# Patient Record
Sex: MALE | Race: WHITE | Employment: FULL TIME | URBAN - NONMETROPOLITAN AREA
[De-identification: names, ages, dates, MRNs, and addresses within clinical notes are randomized per-mention and may not be internally consistent; named-entity substitution may affect disease eponyms.]

---

## 2017-08-07 DIAGNOSIS — R30.0 DYSURIA: Primary | ICD-10-CM

## 2017-08-11 ENCOUNTER — OFFICE VISIT (OUTPATIENT)
Dept: FAMILY MEDICINE CLINIC | Age: 32
End: 2017-08-11
Payer: COMMERCIAL

## 2017-08-11 ENCOUNTER — HOSPITAL ENCOUNTER (OUTPATIENT)
Age: 32
Setting detail: SPECIMEN
Discharge: HOME | End: 2017-08-11
Payer: COMMERCIAL

## 2017-08-11 VITALS
OXYGEN SATURATION: 97 % | SYSTOLIC BLOOD PRESSURE: 120 MMHG | WEIGHT: 130 LBS | DIASTOLIC BLOOD PRESSURE: 72 MMHG | HEIGHT: 66 IN | BODY MASS INDEX: 20.89 KG/M2 | HEART RATE: 81 BPM

## 2017-08-11 DIAGNOSIS — N39.0 URINARY TRACT INFECTION WITHOUT HEMATURIA, SITE UNSPECIFIED: ICD-10-CM

## 2017-08-11 DIAGNOSIS — N39.0 URINARY TRACT INFECTION WITHOUT HEMATURIA, SITE UNSPECIFIED: Primary | ICD-10-CM

## 2017-08-11 LAB
-: ABNORMAL
AMORPHOUS: ABNORMAL
BACTERIA: ABNORMAL
CASTS UA: ABNORMAL /LPF (ref 0–2)
CRYSTALS, UA: ABNORMAL /HPF
EPITHELIAL CELLS UA: ABNORMAL /HPF (ref 0–5)
MUCUS: ABNORMAL
OTHER OBSERVATIONS UA: ABNORMAL
RBC UA: ABNORMAL /HPF (ref 0–4)
RENAL EPITHELIAL, UA: ABNORMAL /HPF
TRICHOMONAS: ABNORMAL
WBC UA: >100 /HPF (ref 0–4)
YEAST: ABNORMAL

## 2017-08-11 PROCEDURE — 99213 OFFICE O/P EST LOW 20 MIN: CPT | Performed by: NURSE PRACTITIONER

## 2017-08-11 RX ORDER — PHENAZOPYRIDINE HYDROCHLORIDE 200 MG/1
200 TABLET, FILM COATED ORAL 3 TIMES DAILY PRN
Qty: 21 TABLET | Refills: 0 | Status: SHIPPED | OUTPATIENT
Start: 2017-08-11 | End: 2021-10-05 | Stop reason: SDUPTHER

## 2017-08-11 RX ORDER — CIPROFLOXACIN 500 MG/1
500 TABLET, FILM COATED ORAL 2 TIMES DAILY
Qty: 28 TABLET | Refills: 0 | Status: SHIPPED | OUTPATIENT
Start: 2017-08-11 | End: 2017-08-25

## 2017-08-11 ASSESSMENT — ENCOUNTER SYMPTOMS
DIARRHEA: 0
SHORTNESS OF BREATH: 0
EYES NEGATIVE: 1
CHEST TIGHTNESS: 0
TROUBLE SWALLOWING: 0
ABDOMINAL PAIN: 0
GASTROINTESTINAL NEGATIVE: 1
ALLERGIC/IMMUNOLOGIC NEGATIVE: 1
VOMITING: 0
CONSTIPATION: 0
SINUS PRESSURE: 0
NAUSEA: 0
RESPIRATORY NEGATIVE: 1
COUGH: 0

## 2017-08-12 LAB
CULTURE: NORMAL
Lab: NORMAL
Lab: NORMAL
SPECIMEN DESCRIPTION: NORMAL
STATUS: NORMAL
STATUS: NORMAL

## 2017-08-14 LAB
C. TRACHOMATIS DNA ,URINE: NEGATIVE
N. GONORRHOEAE DNA, URINE: NEGATIVE

## 2019-06-28 ENCOUNTER — OFFICE VISIT (OUTPATIENT)
Dept: PRIMARY CARE CLINIC | Age: 34
End: 2019-06-28
Payer: COMMERCIAL

## 2019-06-28 VITALS
SYSTOLIC BLOOD PRESSURE: 112 MMHG | WEIGHT: 134.4 LBS | DIASTOLIC BLOOD PRESSURE: 82 MMHG | BODY MASS INDEX: 21.6 KG/M2 | TEMPERATURE: 98 F | OXYGEN SATURATION: 92 % | HEIGHT: 66 IN | HEART RATE: 83 BPM

## 2019-06-28 DIAGNOSIS — M25.562 ACUTE PAIN OF LEFT KNEE: Primary | ICD-10-CM

## 2019-06-28 DIAGNOSIS — D22.9 BENIGN SKIN MOLE: ICD-10-CM

## 2019-06-28 PROCEDURE — 4004F PT TOBACCO SCREEN RCVD TLK: CPT | Performed by: NURSE PRACTITIONER

## 2019-06-28 PROCEDURE — 99213 OFFICE O/P EST LOW 20 MIN: CPT | Performed by: NURSE PRACTITIONER

## 2019-06-28 PROCEDURE — G8420 CALC BMI NORM PARAMETERS: HCPCS | Performed by: NURSE PRACTITIONER

## 2019-06-28 PROCEDURE — G8427 DOCREV CUR MEDS BY ELIG CLIN: HCPCS | Performed by: NURSE PRACTITIONER

## 2019-06-28 ASSESSMENT — ENCOUNTER SYMPTOMS
NAUSEA: 0
SORE THROAT: 0
RESPIRATORY NEGATIVE: 1
COUGH: 0

## 2019-06-28 ASSESSMENT — PATIENT HEALTH QUESTIONNAIRE - PHQ9
SUM OF ALL RESPONSES TO PHQ9 QUESTIONS 1 & 2: 0
SUM OF ALL RESPONSES TO PHQ QUESTIONS 1-9: 0
1. LITTLE INTEREST OR PLEASURE IN DOING THINGS: 0
2. FEELING DOWN, DEPRESSED OR HOPELESS: 0
SUM OF ALL RESPONSES TO PHQ QUESTIONS 1-9: 0

## 2019-06-28 NOTE — PROGRESS NOTES
[facility]             [address] [facility], [address]                        Telephone [phone]             Fax [phone]     Fernanda Mai  1985  JJY:[id number]   Date of visit:  6/28/2019    Subjective:    Fernanda Mai is a 36 y.o.  male who presents to [facility] today (6/28/2019) for evaluation of:    Chief Complaint   Patient presents with    Knee Pain     Left knee. No injury.  Other     Multiple bumps on arms under old tattoo ink       Knee Pain    The incident occurred more than 1 week ago (1 month ago). 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 The pain is present in the left knee. The quality of the pain is described as aching (sharp in the morning). The patient is experiencing no pain. The pain has been intermittent since onset. Pertinent negatives include no inability to bear weight, muscle weakness, numbness or tingling. Nothing aggravates the symptoms. He has tried nothing for the symptoms. The treatment provided no relief. Other   This is a new problem. The current episode started more than 1 month ago. The problem occurs constantly. The problem has been unchanged. Pertinent negatives include no chest pain, chills, congestion, coughing, headaches, joint swelling, nausea, numbness, rash or sore throat. Associated symptoms comments: Bumps on left forearm where old tattoo ink (several years old) is. . Nothing aggravates the symptoms. He has tried nothing for the symptoms. The treatment provided no relief. He has the following problem list:  Patient Active Problem List   Diagnosis    Acute cystitis without hematuria        Current medications are:  No current outpatient medications on file. No current facility-administered medications for this visit.          He is allergic to sulfa antibiotics and As pain recedes, begin normal activities slowly as tolerated. Do not pick at moles on arm, we discussed changes to monitor for. Follow up with PCP if symptoms do not improve or worsen. No orders of the defined types were placed in this encounter.         Electronically signed by JAYESH Bautista CNP on 6/28/19 at 3:17 PM

## 2019-06-28 NOTE — PATIENT INSTRUCTIONS
Patient Education        Moles: Care Instructions  Your Care Instructions  Moles are skin growths made up of cells that produce color (pigment). A mole can appear anywhere on the skin, alone or in groups. Most people get a few moles during their first 20 years of life. They are usually brown in color but can be blue, black, or flesh-colored. Most moles are harmless and do not cause pain or other symptoms, unless you rub them or they bump against something. You usually do not need treatment for moles. But some can turn into cancer. Talk to your doctor if a mole bleeds, itches, burns, or changes size or color. Also let your doctor know if you get a new mole. Make sure to wear sunscreen and other sun protection every day to help prevent skin cancer. Follow-up care is a key part of your treatment and safety. Be sure to make and go to all appointments, and call your doctor if you are having problems. It's also a good idea to know your test results and keep a list of the medicines you take. How can you care for yourself at home? · Check all the skin on your body once a month for skin growths or other changes, such as in the color and feel of the skin. ?  front of a full-length mirror. Look carefully at the front and back of your body. Then look at your right and left sides with your arms raised. ? Bend your elbows and look carefully at your forearms, the back of your upper arms, and your palms. ? Look at your feet, the bottoms of your feet, and the spaces between your toes. ? Use a hand mirror to look at the back of your legs, the back of your neck, and your back, rear end (buttocks), and genital area. Part the hair on your head to look at your scalp. · If you see a change in a skin growth, contact your doctor. Look for:  ? A mole that bleeds. ? A fast-growing mole. ? A scaly or crusted growth on the skin. ? A sore that will not heal.  To prevent skin cancer  · Always wear sunscreen on exposed skin. Make sure to use a broad-spectrum sunscreen that has a sun protection factor (SPF) of 30 or higher. Use it every day, even when it is cloudy. · Wear a wide-brimmed hat and long sleeves and pants if you are going to be outdoors for very long. · Avoid the sun between 10 a.m. and 4 p.m., which is the peak time for the sun's ultraviolet rays. · Avoid sunburns, tanning booths, and sunlamps. · Be sure to protect children from the sun. Sunburns in childhood damage the skin and increase the risk of cancer. When should you call for help? Watch closely for changes in your health, and be sure to contact your doctor if:    · A mole looks different than it did before. It may have changed in size, color, shape, or the way it looks. Where can you learn more? Go to https://[employer]. org and sign in to your [employer] account. Enter F402 in the [employer] box to learn more about \"Moles: Care Instructions. \"     If you do not have an account, please click on the \"Sign Up Now\" link. Current as of: April 17, 2018  Content Version: 12.0  © 3846-0777 [employer]. Care instructions adapted under license by [facility] ([facility]). If you have questions about a medical condition or this instruction, always ask your healthcare professional. [place] [address] any warranty or liability for your use of this information. Patient Education        Knee Pain or Injury: Care Instructions  Your Care Instructions    Injuries are a common cause of knee problems. Sudden (acute) injuries may be caused by a direct blow to the knee. They can also be caused by abnormal twisting, bending, or falling on the knee. Pain, bruising, or swelling may be severe, and may start within minutes of the injury. Overuse is another cause of knee pain. Other causes are climbing stairs, kneeling, and other activities that use the knee.  Everyday wear and tear, especially as you get older, also can cause knee

## 2020-05-08 ENCOUNTER — HOSPITAL ENCOUNTER (OUTPATIENT)
Dept: LAB | Age: 35
Discharge: HOME | End: 2020-05-08
Payer: COMMERCIAL

## 2020-06-16 LAB
SEND OUT REPORT: NORMAL
TEST NAME: NORMAL

## 2021-02-19 ENCOUNTER — OFFICE VISIT (OUTPATIENT)
Dept: PRIMARY CARE CLINIC | Age: 36
End: 2021-02-19
Payer: COMMERCIAL

## 2021-02-19 VITALS
DIASTOLIC BLOOD PRESSURE: 68 MMHG | TEMPERATURE: 98.2 F | BODY MASS INDEX: 21.53 KG/M2 | WEIGHT: 133.4 LBS | OXYGEN SATURATION: 98 % | SYSTOLIC BLOOD PRESSURE: 112 MMHG | HEART RATE: 78 BPM

## 2021-02-19 DIAGNOSIS — S46.911A STRAIN OF RIGHT SHOULDER, INITIAL ENCOUNTER: Primary | ICD-10-CM

## 2021-02-19 DIAGNOSIS — H66.001 NON-RECURRENT ACUTE SUPPURATIVE OTITIS MEDIA OF RIGHT EAR WITHOUT SPONTANEOUS RUPTURE OF TYMPANIC MEMBRANE: ICD-10-CM

## 2021-02-19 DIAGNOSIS — H92.01 ACUTE OTALGIA, RIGHT: ICD-10-CM

## 2021-02-19 DIAGNOSIS — H61.23 BILATERAL HEARING LOSS DUE TO CERUMEN IMPACTION: ICD-10-CM

## 2021-02-19 PROCEDURE — G8427 DOCREV CUR MEDS BY ELIG CLIN: HCPCS | Performed by: FAMILY MEDICINE

## 2021-02-19 PROCEDURE — 99214 OFFICE O/P EST MOD 30 MIN: CPT | Performed by: FAMILY MEDICINE

## 2021-02-19 PROCEDURE — G8420 CALC BMI NORM PARAMETERS: HCPCS | Performed by: FAMILY MEDICINE

## 2021-02-19 PROCEDURE — G8484 FLU IMMUNIZE NO ADMIN: HCPCS | Performed by: FAMILY MEDICINE

## 2021-02-19 PROCEDURE — 99212 OFFICE O/P EST SF 10 MIN: CPT

## 2021-02-19 PROCEDURE — 4004F PT TOBACCO SCREEN RCVD TLK: CPT | Performed by: FAMILY MEDICINE

## 2021-02-19 PROCEDURE — 69210 REMOVE IMPACTED EAR WAX UNI: CPT | Performed by: FAMILY MEDICINE

## 2021-02-19 RX ORDER — AMOXICILLIN 875 MG/1
875 TABLET, COATED ORAL 2 TIMES DAILY
Qty: 20 TABLET | Refills: 0 | Status: SHIPPED | OUTPATIENT
Start: 2021-02-19 | End: 2021-07-08

## 2021-02-19 RX ORDER — PREDNISONE 20 MG/1
TABLET ORAL
Qty: 15 TABLET | Refills: 0 | Status: SHIPPED | OUTPATIENT
Start: 2021-02-19 | End: 2021-07-08

## 2021-02-19 ASSESSMENT — ENCOUNTER SYMPTOMS
COUGH: 0
RHINORRHEA: 0
SINUS PAIN: 0
SINUS PRESSURE: 0
SORE THROAT: 0
BACK PAIN: 0

## 2021-02-19 ASSESSMENT — PATIENT HEALTH QUESTIONNAIRE - PHQ9
SUM OF ALL RESPONSES TO PHQ9 QUESTIONS 1 & 2: 0
2. FEELING DOWN, DEPRESSED OR HOPELESS: 0
SUM OF ALL RESPONSES TO PHQ QUESTIONS 1-9: 0

## 2021-02-19 NOTE — PROGRESS NOTES
2021     Amador Medellin (:  1985) is a 59 [facility] y.o. male, here for evaluation of the following medical concerns:    Shoulder Pain   The pain is present in the neck, back and right shoulder. This is a new problem. The current episode started in the past 7 days (has had intermittent issues with right shoulder pain for awhile. States that last evening he was bowling, however and pain has worsened. ). There has been no history of extremity trauma. The problem occurs constantly. The problem has been gradually worsening. The quality of the pain is described as aching. The pain is moderate. Pertinent negatives include no fever, inability to bear weight, limited range of motion, numbness or tingling. Associated symptoms comments: Pain is mostly to the right posterior cervical and upper thoracic region and posterior aspect of the right shoulder. Does not have radiculopathy. No weakness in the right upper extremity. No numbness . He has tried acetaminophen for the symptoms. The treatment provided mild relief. Otalgia   There is pain in the right ear. This is a new problem. The current episode started in the past 7 days (has had an issue with cerumen build up, but now has had pain in right ear and it feels plugged). The problem occurs constantly. The problem has been gradually worsening. There has been no fever. Associated symptoms include hearing loss and neck pain. Pertinent negatives include no coughing, ear discharge, headaches, rhinorrhea or sore throat. He has tried acetaminophen for the symptoms. The treatment provided mild relief. Did review patient's med list, allergies, social history,pmhx and pshx today as noted in the record. Review of Systems   Constitutional: Negative for chills, fatigue and fever. HENT: Positive for ear pain and hearing loss. Negative for congestion, ear discharge, rhinorrhea, sinus pressure, sinus pain and sore throat. Respiratory: Negative for cough. Musculoskeletal: Positive for arthralgias, myalgias, neck pain and neck stiffness. Negative for back pain, gait problem and joint swelling. Neurological: Negative for tingling, weakness, numbness and headaches. Prior to Visit Medications    Medication Sig Taking? Authorizing Provider   predniSONE (DELTASONE) 20 MG tablet 1 bid for 5 days, 1 qd for 5 days Yes [place], DO        Social History     Tobacco Use    Smoking status: Current Every Day Smoker     Packs/day: 0.50     Years: 15.00     Pack years: 7.50     Types: Cigarettes     Last attempt to quit: 8/3/2015     Years since quittin.5    Smokeless tobacco: Current User     Types: Chew    Tobacco comment: Started snoking    Substance Use Topics    Alcohol use: Yes     Comment: 1 alcoholic beverage per day. Vitals:    21 1116   BP: 112/68   Site: Left Upper Arm   Position: Sitting   Cuff Size: Large Adult   Pulse: 78   Temp: 98.2 °F (36.8 °C)   TempSrc: Tympanic   SpO2: 98%   Weight: 133 lb 6.4 oz (60.5 kg)     Estimated body mass index is 21.53 kg/m² as calculated from the following:    Height as of 19: 5' 6\" (1.676 m). Weight as of this encounter: 133 lb 6.4 oz (60.5 kg). Physical Exam  Vitals signs and nursing note reviewed. Constitutional:       General: He is not in acute distress. Appearance: He is well-developed. He is not diaphoretic. HENT:      Head: Normocephalic and atraumatic. Right Ear: There is impacted cerumen. Left Ear: Tympanic membrane normal. There is impacted cerumen. Ears:      Comments: Cerumen is removed as noted below and ears are reassessed. TM on the right is erythematous and bulging. Eyes:      Conjunctiva/sclera: Conjunctivae normal.   Neck:      Musculoskeletal: Normal range of motion. Pulmonary:      Effort: Pulmonary effort is normal.   Musculoskeletal: Normal range of motion. General: Tenderness present. No swelling.       Comments: Increased

## 2021-07-08 ENCOUNTER — OFFICE VISIT (OUTPATIENT)
Dept: PRIMARY CARE CLINIC | Age: 36
End: 2021-07-08
Payer: COMMERCIAL

## 2021-07-08 VITALS
SYSTOLIC BLOOD PRESSURE: 110 MMHG | HEART RATE: 70 BPM | WEIGHT: 131.4 LBS | BODY MASS INDEX: 21.21 KG/M2 | DIASTOLIC BLOOD PRESSURE: 74 MMHG | TEMPERATURE: 98.2 F | OXYGEN SATURATION: 98 %

## 2021-07-08 DIAGNOSIS — M25.541 ARTHRALGIA OF METACARPOPHALANGEAL JOINT, RIGHT: Primary | ICD-10-CM

## 2021-07-08 DIAGNOSIS — R68.3 CLUBBING OF FINGERS: ICD-10-CM

## 2021-07-08 PROCEDURE — 99213 OFFICE O/P EST LOW 20 MIN: CPT | Performed by: FAMILY MEDICINE

## 2021-07-08 PROCEDURE — G8420 CALC BMI NORM PARAMETERS: HCPCS | Performed by: FAMILY MEDICINE

## 2021-07-08 PROCEDURE — 99213 OFFICE O/P EST LOW 20 MIN: CPT

## 2021-07-08 PROCEDURE — 4004F PT TOBACCO SCREEN RCVD TLK: CPT | Performed by: FAMILY MEDICINE

## 2021-07-08 PROCEDURE — G8427 DOCREV CUR MEDS BY ELIG CLIN: HCPCS | Performed by: FAMILY MEDICINE

## 2021-07-08 ASSESSMENT — PATIENT HEALTH QUESTIONNAIRE - PHQ9
SUM OF ALL RESPONSES TO PHQ QUESTIONS 1-9: 0
1. LITTLE INTEREST OR PLEASURE IN DOING THINGS: 0
SUM OF ALL RESPONSES TO PHQ9 QUESTIONS 1 & 2: 0
2. FEELING DOWN, DEPRESSED OR HOPELESS: 0

## 2021-07-08 NOTE — PROGRESS NOTES
Subjective:      Patient ID: Wojciech Pérez is a 29 y.o. male. HPI   Acute urgent care visit for pain at the base of both thumbs, right >left. Right hand dominant. Pain noticeable most of the day over the last month. Pain more at work. 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 Grabbing and carrying turkeys is what seems to aggravate the issue the most.      Past Medical History:   Diagnosis Date    Knee pain, bilateral     Intermittent.  Urethral stricture      Past Surgical History:   Procedure Laterality Date    FRACTURE SURGERY Right     ORIF radius.  LYMPH NODE BIOPSY  02/17/2014    non malignant    URETHRAL STRICTURE DILATATION       No current outpatient medications on file. No current facility-administered medications for this visit. Allergies   Allergen Reactions    Sulfa Antibiotics Swelling     Severe swelling.  Sulfamethoxazole-Trimethoprim Swelling     Ros: thumb pain. All other systems negative          Objective:   Physical Exam  HENT:      Head: Normocephalic and atraumatic. Nose: Nose normal.   Eyes:      Pupils: Pupils are equal, round, and reactive to light. Cardiovascular:      Rate and Rhythm: Normal rate and regular rhythm. Pulses: Normal pulses. Heart sounds: No murmur heard. Pulmonary:      Effort: Pulmonary effort is normal.   Abdominal:      General: There is no distension. Musculoskeletal:         General: Normal range of motion. Right hand: Bony tenderness (mcp of right thumb) present. Left hand: Bony tenderness present. Cervical back: Normal range of motion. Comments: Clubbing of fingertips noted incidentally. Neurological:      General: No focal deficit present. Mental Status: He is alert. Psychiatric:         Mood and Affect: Mood normal.         Thought Content:  Thought content normal.         Judgment: Judgment normal.       /74 (Site: Left Upper Arm, Position: Sitting, Cuff Size: Large Adult) Pulse 70   Temp 98.2 °F (36.8 °C) (Tympanic)   Wt 131 lb 6.4 oz (59.6 kg)   SpO2 98%   BMI 21.21 kg/m²     Assessment:      Encounter Diagnoses   Name Primary?  Arthralgia of metacarpophalangeal joint, right Yes    Clubbing of fingers            Plan:      Overuse injury of mcp joints of both thumbs, right >left. Consider ergonomic changes to lessen the repetitive use of thumb. Splint mcp , ibuprofen every 8 hrs with food , side effects discussed. Clubbing of fingers. Incidental finding. Rec. Discussing further work up with pcp at next follow up. Stop smoking. Offered cessation.               Sharon Patel MD

## 2021-09-17 ENCOUNTER — TELEPHONE (OUTPATIENT)
Dept: FAMILY MEDICINE CLINIC | Age: 36
End: 2021-09-17

## 2021-09-17 DIAGNOSIS — N39.0 URINARY TRACT INFECTION WITHOUT HEMATURIA, SITE UNSPECIFIED: Primary | ICD-10-CM

## 2021-09-17 NOTE — TELEPHONE ENCOUNTER
Pt's wife called in stated  was in the ER for a uti and they gave him antibiotics. Pt's wife stated she wanted to see if the pt needed to come in to see dr Mauro Burton for a f/u and a referral to a urologist, I stated yes in order to get a referral he would have to be seen by dr. Clarisa Styles wife voiced understanding and asked if you could giver her a call since pt is at work. Pt was in hospital 09/08/21 at [address] [place] [address].

## 2021-09-17 NOTE — TELEPHONE ENCOUNTER
Spoke with wife and patient is going to come into office to discuss this with Dr Isaiah De La Rosa. Ordered a repeat UA with culture for him to complete Sat so we can see if the infection is cleared.

## 2021-09-18 ENCOUNTER — HOSPITAL ENCOUNTER (OUTPATIENT)
Dept: LAB | Age: 36
Discharge: HOME | End: 2021-09-18
Payer: COMMERCIAL

## 2021-09-18 DIAGNOSIS — N39.0 URINARY TRACT INFECTION WITHOUT HEMATURIA, SITE UNSPECIFIED: ICD-10-CM

## 2021-09-18 LAB
-: ABNORMAL
AMORPHOUS: ABNORMAL
BACTERIA: ABNORMAL
BILIRUBIN URINE: NEGATIVE
CASTS UA: ABNORMAL /LPF (ref 0–2)
COLOR: ABNORMAL
COMMENT UA: ABNORMAL
CRYSTALS, UA: ABNORMAL /HPF
EPITHELIAL CELLS UA: ABNORMAL /HPF (ref 0–5)
GLUCOSE URINE: NEGATIVE
KETONES, URINE: NEGATIVE
LEUKOCYTE ESTERASE, URINE: ABNORMAL
MUCUS: ABNORMAL
NITRITE, URINE: POSITIVE
OTHER OBSERVATIONS UA: ABNORMAL
PH UA: 6.5 (ref 5–6)
PROTEIN UA: NEGATIVE
RBC UA: ABNORMAL /HPF (ref 0–4)
RENAL EPITHELIAL, UA: ABNORMAL /HPF
SPECIFIC GRAVITY UA: 1.02 (ref 1.01–1.02)
TRICHOMONAS: ABNORMAL
TURBIDITY: ABNORMAL
URINE HGB: ABNORMAL
UROBILINOGEN, URINE: NORMAL
WBC UA: >100 /HPF (ref 0–4)
YEAST: ABNORMAL

## 2021-09-18 PROCEDURE — 87086 URINE CULTURE/COLONY COUNT: CPT

## 2021-09-18 PROCEDURE — 81001 URINALYSIS AUTO W/SCOPE: CPT

## 2021-09-18 PROCEDURE — 87186 SC STD MICRODIL/AGAR DIL: CPT

## 2021-09-18 PROCEDURE — 86403 PARTICLE AGGLUT ANTBDY SCRN: CPT

## 2021-09-20 LAB
CULTURE: ABNORMAL
Lab: ABNORMAL
SPECIMEN DESCRIPTION: ABNORMAL

## 2021-09-20 RX ORDER — DOXYCYCLINE HYCLATE 100 MG
100 TABLET ORAL 2 TIMES DAILY
Qty: 14 TABLET | Refills: 0 | Status: SHIPPED | OUTPATIENT
Start: 2021-09-20 | End: 2021-10-05 | Stop reason: SDUPTHER

## 2021-09-22 ENCOUNTER — OFFICE VISIT (OUTPATIENT)
Dept: FAMILY MEDICINE CLINIC | Age: 36
End: 2021-09-22
Payer: COMMERCIAL

## 2021-09-22 VITALS
DIASTOLIC BLOOD PRESSURE: 60 MMHG | HEART RATE: 72 BPM | HEIGHT: 66 IN | BODY MASS INDEX: 22.18 KG/M2 | WEIGHT: 138 LBS | SYSTOLIC BLOOD PRESSURE: 100 MMHG

## 2021-09-22 DIAGNOSIS — Z23 NEED FOR VACCINATION: ICD-10-CM

## 2021-09-22 DIAGNOSIS — N39.0 RECURRENT UTI: Primary | ICD-10-CM

## 2021-09-22 PROCEDURE — 4004F PT TOBACCO SCREEN RCVD TLK: CPT | Performed by: FAMILY MEDICINE

## 2021-09-22 PROCEDURE — G8420 CALC BMI NORM PARAMETERS: HCPCS | Performed by: FAMILY MEDICINE

## 2021-09-22 PROCEDURE — PBSHW INFLUENZA, QUADV, 3 YRS AND OLDER, IM PF, PREFILL SYR OR SDV, 0.5ML (AFLURIA QUADV, PF): Performed by: FAMILY MEDICINE

## 2021-09-22 PROCEDURE — 90715 TDAP VACCINE 7 YRS/> IM: CPT | Performed by: FAMILY MEDICINE

## 2021-09-22 PROCEDURE — PBSHW TDAP (AGE 10Y AND OLDER) IM (BOOSTRIX): Performed by: FAMILY MEDICINE

## 2021-09-22 PROCEDURE — 99212 OFFICE O/P EST SF 10 MIN: CPT | Performed by: FAMILY MEDICINE

## 2021-09-22 PROCEDURE — 90686 IIV4 VACC NO PRSV 0.5 ML IM: CPT | Performed by: FAMILY MEDICINE

## 2021-09-22 PROCEDURE — G8427 DOCREV CUR MEDS BY ELIG CLIN: HCPCS | Performed by: FAMILY MEDICINE

## 2021-09-22 PROCEDURE — 99214 OFFICE O/P EST MOD 30 MIN: CPT | Performed by: FAMILY MEDICINE

## 2021-09-22 RX ORDER — PHENAZOPYRIDINE HYDROCHLORIDE 200 MG/1
200 TABLET, FILM COATED ORAL 3 TIMES DAILY PRN
Qty: 6 TABLET | Refills: 0 | Status: SHIPPED | OUTPATIENT
Start: 2021-09-22 | End: 2021-09-24

## 2021-09-22 SDOH — ECONOMIC STABILITY: FOOD INSECURITY: WITHIN THE PAST 12 MONTHS, THE FOOD YOU BOUGHT JUST DIDN'T LAST AND YOU DIDN'T HAVE MONEY TO GET MORE.: NEVER TRUE

## 2021-09-22 SDOH — ECONOMIC STABILITY: FOOD INSECURITY: WITHIN THE PAST 12 MONTHS, YOU WORRIED THAT YOUR FOOD WOULD RUN OUT BEFORE YOU GOT MONEY TO BUY MORE.: NEVER TRUE

## 2021-09-22 ASSESSMENT — ENCOUNTER SYMPTOMS
CONSTIPATION: 0
VOMITING: 0
NAUSEA: 0
DIARRHEA: 0
ABDOMINAL PAIN: 0

## 2021-09-22 ASSESSMENT — SOCIAL DETERMINANTS OF HEALTH (SDOH): HOW HARD IS IT FOR YOU TO PAY FOR THE VERY BASICS LIKE FOOD, HOUSING, MEDICAL CARE, AND HEATING?: NOT HARD AT ALL

## 2021-09-22 NOTE — PROGRESS NOTES
2021     Claudeen Canard (:  1985) is a 29 y.o. male, here for evaluation of the following medical concerns:    Urinary Tract Infection   This is a new problem. The current episode started 1 to 4 weeks ago (patient was seen in the ER at [facility] on 21 for acute urinary frequency and dysuria. Treated for UTI with Keflex. Still having symptoms). The problem occurs every urination. The problem has been unchanged. The quality of the pain is described as burning. Associated symptoms include frequency and urgency. Pertinent negatives include no chills, flank pain, hematuria, nausea or vomiting. Associated symptoms comments: Has weak urinary stream chronically. Gets a UTI about once a year. Feels like his urinary stream is getting weaker as time goes on. No fever. Did repeat his urinalysis this weekend and culture did show MRSA. Likely not appropriately covered by the keflex that had been prescribed. No flank pain. Does have some urgency. No hematuria. . Treatments tried: was on Keflex. I prescribed Doxycycline after his urine culture report came back from the urine specimen he obtained this weekend. The treatment provided no relief. His past medical history is significant for recurrent UTIs. Did review patient's med list, allergies, social history,pmhx and pshx today as noted in the record. Review of Systems   Constitutional: Negative for chills, fatigue and fever. Gastrointestinal: Negative for abdominal pain, constipation, diarrhea, nausea and vomiting. Genitourinary: Positive for difficulty urinating, dysuria, frequency and urgency. Negative for decreased urine volume, flank pain, hematuria, scrotal swelling and testicular pain. Prior to Visit Medications    Medication Sig Taking?  Authorizing Provider   phenazopyridine (PYRIDIUM) 200 MG tablet Take 1 tablet by mouth 3 times daily as needed for Pain Yes Demetria Meier, DO   doxycycline hyclate (VIBRA-TABS) 100 MG tablet Take 1 tablet by mouth 2 times daily for 7 days Yes Carey Kind, DO        Social History     Tobacco Use    Smoking status: Current Every Day Smoker     Packs/day: 0.50     Years: 15.00     Pack years: 7.50     Types: Cigarettes     Last attempt to quit: 8/3/2015     Years since quittin.1    Smokeless tobacco: Current User     Types: Chew    Tobacco comment: Started snoking    Substance Use Topics    Alcohol use: Yes     Comment: 1 alcoholic beverage per day. Vitals:    21 1336   BP: 100/60   Site: Left Upper Arm   Position: Sitting   Cuff Size: Medium Adult   Pulse: 72   Weight: 138 lb (62.6 kg)   Height: 5' 6\" (1.676 m)     Estimated body mass index is 22.27 kg/m² as calculated from the following:    Height as of this encounter: 5' 6\" (1.676 m). Weight as of this encounter: 138 lb (62.6 kg). Physical Exam  Vitals and nursing note reviewed. Constitutional:       General: He is not in acute distress. Appearance: Normal appearance. He is well-developed. He is not diaphoretic. HENT:      Head: Normocephalic and atraumatic. Nose: Nose normal.   Eyes:      General:         Right eye: No discharge. Left eye: No discharge. Conjunctiva/sclera: Conjunctivae normal.   Cardiovascular:      Rate and Rhythm: Normal rate and regular rhythm. Pulmonary:      Effort: Pulmonary effort is normal. No respiratory distress. Breath sounds: Normal breath sounds. No wheezing. Abdominal:      General: Bowel sounds are normal. There is no distension. Palpations: Abdomen is soft. There is no mass. Tenderness: There is abdominal tenderness (suprapubic). There is no right CVA tenderness, left CVA tenderness, guarding or rebound. Musculoskeletal:      Cervical back: Normal range of motion and neck supple. No rigidity. Lymphadenopathy:      Cervical: No cervical adenopathy. Skin:     General: Skin is warm and dry.    Neurological:      Mental Status: He is alert and oriented to person, place, and time. Psychiatric:         Behavior: Behavior normal.         Thought Content: Thought content normal.         Judgment: Judgment normal.         ASSESSMENT/PLAN:  Encounter Diagnoses   Name Primary?  Recurrent UTI Yes    Need for vaccination      Orders Placed This Encounter   Medications    phenazopyridine (PYRIDIUM) 200 MG tablet     Sig: Take 1 tablet by mouth 3 times daily as needed for Pain     Dispense:  6 tablet     Refill:  0     Will give pyridium to see if this will help with urinary symptoms. Should continue on the doxycycline as ordered. Increase fluids and rest    Orders Placed This Encounter   Procedures    INFLUENZA, QUADV, 3 YRS AND OLDER, IM PF, PREFILL SYR OR SDV, 0.5ML (Mel Reich, PF)    Tdap (age 6y and older) IM ([address] [place] [address] Extension)   Duane Vergara MD, Urology, [place]     Referral Priority:   Routine     Referral Type:   Eval and Treat     Referral Reason:   Specialty Services Required     Referred to Provider:   Isacc Bhandari MD     Requested Specialty:   Urology     Number of Visits Requested:   1     Will refer patient to urology for opinion regarding the decreased urine stream and recurrent uti. Possible urethral stenosis? Return  if no improvement in symptoms or if any further symptoms arise. No follow-ups on file. An electronic signature was used to authenticate this note.     --María Martinez DO on 9/22/2021 at 5:00 PM

## 2021-10-05 ENCOUNTER — HOSPITAL ENCOUNTER (OUTPATIENT)
Age: 36
Setting detail: SPECIMEN
Discharge: HOME | End: 2021-10-05
Payer: COMMERCIAL

## 2021-10-05 ENCOUNTER — OFFICE VISIT (OUTPATIENT)
Dept: PRIMARY CARE CLINIC | Age: 36
End: 2021-10-05
Payer: COMMERCIAL

## 2021-10-05 ENCOUNTER — HOSPITAL ENCOUNTER (OUTPATIENT)
Dept: CT IMAGING | Age: 36
Discharge: HOME | End: 2021-10-07
Payer: COMMERCIAL

## 2021-10-05 VITALS
SYSTOLIC BLOOD PRESSURE: 116 MMHG | RESPIRATION RATE: 16 BRPM | OXYGEN SATURATION: 98 % | DIASTOLIC BLOOD PRESSURE: 80 MMHG | TEMPERATURE: 96.8 F | HEIGHT: 67 IN | WEIGHT: 134.4 LBS | HEART RATE: 90 BPM | BODY MASS INDEX: 21.09 KG/M2

## 2021-10-05 DIAGNOSIS — R31.29 OTHER MICROSCOPIC HEMATURIA: ICD-10-CM

## 2021-10-05 DIAGNOSIS — R10.9 ABDOMINAL PAIN, UNSPECIFIED ABDOMINAL LOCATION: ICD-10-CM

## 2021-10-05 DIAGNOSIS — R33.9 URINARY RETENTION: ICD-10-CM

## 2021-10-05 DIAGNOSIS — N39.0 RECURRENT UTI: ICD-10-CM

## 2021-10-05 DIAGNOSIS — R35.0 URINARY FREQUENCY: ICD-10-CM

## 2021-10-05 DIAGNOSIS — N39.0 URINARY TRACT INFECTION WITHOUT HEMATURIA, SITE UNSPECIFIED: Primary | ICD-10-CM

## 2021-10-05 LAB
-: ABNORMAL
AMORPHOUS: ABNORMAL
BACTERIA: ABNORMAL
BILIRUBIN URINE: NEGATIVE
CASTS UA: ABNORMAL /LPF (ref 0–2)
COLOR: ABNORMAL
COMMENT UA: ABNORMAL
CRYSTALS, UA: ABNORMAL /HPF
EPITHELIAL CELLS UA: ABNORMAL /HPF (ref 0–5)
GLUCOSE URINE: NEGATIVE
KETONES, URINE: NEGATIVE
LEUKOCYTE ESTERASE, URINE: ABNORMAL
MUCUS: ABNORMAL
NITRITE, URINE: NEGATIVE
OTHER OBSERVATIONS UA: ABNORMAL
PH UA: 6 (ref 5–6)
PROTEIN UA: ABNORMAL
RBC UA: ABNORMAL /HPF (ref 0–4)
RENAL EPITHELIAL, UA: ABNORMAL /HPF
SPECIFIC GRAVITY UA: 1.03 (ref 1.01–1.02)
TRICHOMONAS: ABNORMAL
TURBIDITY: ABNORMAL
URINE HGB: ABNORMAL
UROBILINOGEN, URINE: NORMAL
WBC UA: >50 /HPF (ref 0–4)
YEAST: ABNORMAL

## 2021-10-05 PROCEDURE — 4004F PT TOBACCO SCREEN RCVD TLK: CPT | Performed by: FAMILY MEDICINE

## 2021-10-05 PROCEDURE — 99214 OFFICE O/P EST MOD 30 MIN: CPT | Performed by: FAMILY MEDICINE

## 2021-10-05 PROCEDURE — 81001 URINALYSIS AUTO W/SCOPE: CPT

## 2021-10-05 PROCEDURE — 99212 OFFICE O/P EST SF 10 MIN: CPT | Performed by: FAMILY MEDICINE

## 2021-10-05 PROCEDURE — G8482 FLU IMMUNIZE ORDER/ADMIN: HCPCS | Performed by: FAMILY MEDICINE

## 2021-10-05 PROCEDURE — 74176 CT ABD & PELVIS W/O CONTRAST: CPT

## 2021-10-05 PROCEDURE — 86403 PARTICLE AGGLUT ANTBDY SCRN: CPT

## 2021-10-05 PROCEDURE — G8420 CALC BMI NORM PARAMETERS: HCPCS | Performed by: FAMILY MEDICINE

## 2021-10-05 PROCEDURE — G8427 DOCREV CUR MEDS BY ELIG CLIN: HCPCS | Performed by: FAMILY MEDICINE

## 2021-10-05 PROCEDURE — 87086 URINE CULTURE/COLONY COUNT: CPT

## 2021-10-05 RX ORDER — DOXYCYCLINE HYCLATE 100 MG
100 TABLET ORAL 2 TIMES DAILY
Qty: 20 TABLET | Refills: 0 | Status: SHIPPED | OUTPATIENT
Start: 2021-10-05 | End: 2021-10-15

## 2021-10-05 RX ORDER — PHENAZOPYRIDINE HYDROCHLORIDE 200 MG/1
200 TABLET, FILM COATED ORAL 3 TIMES DAILY PRN
Qty: 21 TABLET | Refills: 0 | Status: SHIPPED | OUTPATIENT
Start: 2021-10-05 | End: 2021-10-12

## 2021-10-05 ASSESSMENT — ENCOUNTER SYMPTOMS
CONSTIPATION: 0
DIARRHEA: 0
BACK PAIN: 0
NAUSEA: 0
VOMITING: 0
EYES NEGATIVE: 1
RESPIRATORY NEGATIVE: 1
ABDOMINAL PAIN: 1

## 2021-10-05 ASSESSMENT — PATIENT HEALTH QUESTIONNAIRE - PHQ9
2. FEELING DOWN, DEPRESSED OR HOPELESS: 0
SUM OF ALL RESPONSES TO PHQ9 QUESTIONS 1 & 2: 0
1. LITTLE INTEREST OR PLEASURE IN DOING THINGS: 0
SUM OF ALL RESPONSES TO PHQ QUESTIONS 1-9: 0

## 2021-10-05 NOTE — PROGRESS NOTES
10/5/2021     Author Raj (:  1985) is a 29 y.o. male, here for evaluation of the following medical concerns:    Urinary Tract Infection   This is a new problem. The current episode started in the past 7 days (patient has started to have issues with dribbling and difficulty urinating since this weekend. Has had 2 urinary infections recently which clear with antibiotic but then comes back). The problem occurs every urination. The problem has been gradually worsening. Quality: feels pressure in lower abdomen. The pain is moderate. There has been no fever. Associated symptoms include frequency (but unable to go), hesitancy and urgency. Pertinent negatives include no chills, flank pain, hematuria, nausea or vomiting. Treatments tried: last urine culture was MRSA and treated with doxycycline and pyridium  Did improve but now has recurrent symptoms. Did review patient's med list, allergies, social history,pmhx and pshx today as noted in the record. Review of Systems   Constitutional: Negative for chills, fatigue and fever. HENT: Negative. Eyes: Negative. Respiratory: Negative. Cardiovascular: Negative. Gastrointestinal: Positive for abdominal pain. Negative for constipation, diarrhea, nausea and vomiting. Genitourinary: Positive for difficulty urinating, dysuria, frequency (but unable to go), hesitancy and urgency. Negative for flank pain and hematuria. Musculoskeletal: Negative for back pain and myalgias. Prior to Visit Medications    Medication Sig Taking?  Authorizing Provider   doxycycline hyclate (VIBRA-TABS) 100 MG tablet Take 1 tablet by mouth 2 times daily for 10 days Yes Kristen Mike DO   phenazopyridine (PYRIDIUM) 200 MG tablet Take 1 tablet by mouth 3 times daily as needed for Pain Yes Kristen Mike DO        Social History     Tobacco Use    Smoking status: Current Every Day Smoker     Packs/day: 0.50     Years: 15.00     Pack years: 7.50 Types: Cigarettes     Last attempt to quit: 8/3/2015     Years since quittin.1    Smokeless tobacco: Current User     Types: Chew    Tobacco comment: Started snoking    Substance Use Topics    Alcohol use: Yes     Comment: 1 alcoholic beverage per day. Vitals:    10/05/21 1015   BP: 116/80   Pulse: 90   Resp: 16   Temp: 96.8 °F (36 °C)   SpO2: 98%   Weight: 134 lb 6.4 oz (61 kg)   Height: 5' 6.5\" (1.689 m)     Estimated body mass index is 21.37 kg/m² as calculated from the following:    Height as of this encounter: 5' 6.5\" (1.689 m). Weight as of this encounter: 134 lb 6.4 oz (61 kg). Physical Exam  Vitals and nursing note reviewed. Constitutional:       General: He is not in acute distress. Appearance: Normal appearance. He is well-developed. He is not diaphoretic. HENT:      Head: Normocephalic and atraumatic. Nose: Nose normal.   Eyes:      General:         Right eye: No discharge. Left eye: No discharge. Conjunctiva/sclera: Conjunctivae normal.   Cardiovascular:      Rate and Rhythm: Normal rate and regular rhythm. Pulmonary:      Effort: Pulmonary effort is normal. No respiratory distress. Breath sounds: Normal breath sounds. No wheezing. Abdominal:      General: Bowel sounds are normal. There is no distension. Palpations: Abdomen is soft. There is no mass. Tenderness: There is abdominal tenderness (suprapubic). There is no right CVA tenderness, left CVA tenderness, guarding or rebound. Comments: Palpable bladder distention   Musculoskeletal:      Cervical back: Normal range of motion and neck supple. No rigidity. Lymphadenopathy:      Cervical: No cervical adenopathy. Skin:     General: Skin is warm and dry. Neurological:      Mental Status: He is alert and oriented to person, place, and time. Psychiatric:         Behavior: Behavior normal.         Thought Content:  Thought content normal.         Judgment: Judgment normal. ASSESSMENT/PLAN:  Encounter Diagnoses   Name Primary?  Urinary tract infection without hematuria, site unspecified Yes    Urinary frequency     Other microscopic hematuria     Abdominal pain, unspecified abdominal location     Recurrent UTI     Urinary retention      Orders Placed This Encounter   Medications    doxycycline hyclate (VIBRA-TABS) 100 MG tablet     Sig: Take 1 tablet by mouth 2 times daily for 10 days     Dispense:  20 tablet     Refill:  0    phenazopyridine (PYRIDIUM) 200 MG tablet     Sig: Take 1 tablet by mouth 3 times daily as needed for Pain     Dispense:  21 tablet     Refill:  0     Orders Placed This Encounter   Procedures    CT ABDOMEN PELVIS WO CONTRAST Additional Contrast? None     Standing Status:   Future     Number of Occurrences:   1     Standing Expiration Date:   10/5/2022     Order Specific Question:   Additional Contrast?     Answer:   None     Order Specific Question:   Reason for exam:     Answer:   recurrent UTI, urinary retention, hematuria, lower abdominal pressure    Urinalysis Reflex to Culture     Standing Status:   Future     Number of Occurrences:   1     Standing Expiration Date:   10/5/2022     Order Specific Question:   SPECIFY(EX-CATH,MIDSTREAM,CYSTO,ETC)? Answer:   midstream     UA is reviewed and does again show evidence of infection. CT ABDOMEN PELVIS WO CONTRAST Additional Contrast? None  Narrative: EXAMINATION:  CT OF THE ABDOMEN AND PELVIS WITHOUT CONTRAST 10/5/2021 11:07 am    TECHNIQUE:  CT of the abdomen and pelvis was performed without the administration of  intravenous contrast. Multiplanar reformatted images are provided for review. Dose modulation, iterative reconstruction, and/or weight based adjustment of  the mA/kV was utilized to reduce the radiation dose to as low as reasonably  achievable. COMPARISON:  None.     HISTORY:  ORDERING SYSTEM PROVIDED HISTORY: Other microscopic hematuria  TECHNOLOGIST PROVIDED HISTORY:    recurrent UTI, urinary retention, hematuria, lower abdominal pressure    FINDINGS:  LOWER CHEST: No focal abnormality. KIDNEYS AND URINARY TRACT: No renal calculi are identified. There is no  evidence for hydronephrosis. The ureters are of normal course and caliber. ORGANS: Lack of intravenous contrast limits evaluation of the solid organs  and bowel. The liver, gallbladder, pancreas, spleen, and adrenals reveal no  acute abnormality. GI/BOWEL: No bowel dilatation or wall thickening. Normal appendix. PELVIS: No acute findings. The bladder is well distended. Wide mouth  posterior bladder diverticula are present measuring 2 cm in width on the left  and 2.9 cm on the right. The prostate contains coarse calcification and does  not appear significantly enlarged. PERITONEUM/RETROPERITONEUM: No lymphadenopathy is noted. No free air or free  fluid. The aorta is normal in caliber. BONES/SOFT TISSUES: No acute osseous abnormality is identified. Impression: 1. Distended bladder without wall thickening or surrounding inflammatory  change. Posterior bladder diverticula. 2.  No renal calculi or hydronephrosis identified. Did recommend catheterization due to bladder distention. Patient refused today. Will try medication to see if this will clear underlying infection. May have urinary retention from acute infection, but am concerned about the fact that he also has such large posterior bladder diverticula. Unclear etiology. Treated with RX as noted above. Follow up with urology as scheduled for next week. If persistent issues, did advise patient that he needs to return to get catheter placed. He does agree that he will return if he doesn't see improvement in his urine output in the next 24-48 hours. Return  if no improvement in symptoms or if any further symptoms arise. No follow-ups on file.     An electronic signature was used to authenticate this note.    --Elias Yepez DO on 10/5/2021 at 2:20 PM

## 2021-10-07 LAB
CULTURE: ABNORMAL
Lab: ABNORMAL
SPECIMEN DESCRIPTION: ABNORMAL

## 2021-10-08 ENCOUNTER — OFFICE VISIT (OUTPATIENT)
Dept: PRIMARY CARE CLINIC | Age: 36
End: 2021-10-08
Payer: COMMERCIAL

## 2021-10-08 ENCOUNTER — HOSPITAL ENCOUNTER (EMERGENCY)
Age: 36
Discharge: HOME | End: 2021-10-08
Attending: EMERGENCY MEDICINE
Payer: COMMERCIAL

## 2021-10-08 VITALS
TEMPERATURE: 98.2 F | OXYGEN SATURATION: 93 % | HEART RATE: 83 BPM | SYSTOLIC BLOOD PRESSURE: 121 MMHG | DIASTOLIC BLOOD PRESSURE: 75 MMHG | RESPIRATION RATE: 18 BRPM

## 2021-10-08 VITALS
OXYGEN SATURATION: 97 % | RESPIRATION RATE: 18 BRPM | HEART RATE: 84 BPM | TEMPERATURE: 97.3 F | WEIGHT: 134.6 LBS | DIASTOLIC BLOOD PRESSURE: 82 MMHG | SYSTOLIC BLOOD PRESSURE: 102 MMHG | HEIGHT: 67 IN | BODY MASS INDEX: 21.12 KG/M2

## 2021-10-08 DIAGNOSIS — R33.9 URINARY RETENTION WITH INCOMPLETE BLADDER EMPTYING: ICD-10-CM

## 2021-10-08 DIAGNOSIS — R33.9 URINARY RETENTION: Primary | ICD-10-CM

## 2021-10-08 DIAGNOSIS — N32.89 BLADDER DISTENSION: Primary | ICD-10-CM

## 2021-10-08 LAB
ANION GAP SERPL CALCULATED.3IONS-SCNC: 10 MMOL/L (ref 9–17)
BUN BLDV-MCNC: 15 MG/DL (ref 6–20)
BUN/CREAT BLD: NORMAL (ref 9–20)
CALCIUM SERPL-MCNC: 9.5 MG/DL (ref 8.6–10.4)
CHLORIDE BLD-SCNC: 102 MMOL/L (ref 98–107)
CO2: 24 MMOL/L (ref 20–31)
CREAT SERPL-MCNC: 0.78 MG/DL (ref 0.7–1.2)
GFR AFRICAN AMERICAN: >60 ML/MIN
GFR NON-AFRICAN AMERICAN: >60 ML/MIN
GFR SERPL CREATININE-BSD FRML MDRD: NORMAL ML/MIN/{1.73_M2}
GFR SERPL CREATININE-BSD FRML MDRD: NORMAL ML/MIN/{1.73_M2}
GLUCOSE BLD-MCNC: 94 MG/DL (ref 70–99)
POTASSIUM SERPL-SCNC: 3.9 MMOL/L (ref 3.7–5.3)
SODIUM BLD-SCNC: 136 MMOL/L (ref 135–144)

## 2021-10-08 PROCEDURE — 99212 OFFICE O/P EST SF 10 MIN: CPT | Performed by: NURSE PRACTITIONER

## 2021-10-08 PROCEDURE — 51702 INSERT TEMP BLADDER CATH: CPT

## 2021-10-08 PROCEDURE — 99214 OFFICE O/P EST MOD 30 MIN: CPT | Performed by: NURSE PRACTITIONER

## 2021-10-08 PROCEDURE — 80048 BASIC METABOLIC PNL TOTAL CA: CPT

## 2021-10-08 PROCEDURE — 96374 THER/PROPH/DIAG INJ IV PUSH: CPT

## 2021-10-08 PROCEDURE — 51798 US URINE CAPACITY MEASURE: CPT

## 2021-10-08 PROCEDURE — 99283 EMERGENCY DEPT VISIT LOW MDM: CPT

## 2021-10-08 PROCEDURE — 6360000002 HC RX W HCPCS: Performed by: STUDENT IN AN ORGANIZED HEALTH CARE EDUCATION/TRAINING PROGRAM

## 2021-10-08 RX ORDER — CIPROFLOXACIN 500 MG/1
500 TABLET, FILM COATED ORAL 2 TIMES DAILY
Qty: 14 TABLET | Refills: 0 | Status: SHIPPED | OUTPATIENT
Start: 2021-10-08 | End: 2021-10-15

## 2021-10-08 RX ORDER — LIDOCAINE HYDROCHLORIDE 20 MG/ML
JELLY TOPICAL ONCE
Status: DISCONTINUED | OUTPATIENT
Start: 2021-10-08 | End: 2021-10-08 | Stop reason: HOSPADM

## 2021-10-08 RX ORDER — CIPROFLOXACIN 2 MG/ML
400 INJECTION, SOLUTION INTRAVENOUS ONCE
Status: CANCELLED | OUTPATIENT
Start: 2021-10-08 | End: 2021-10-08

## 2021-10-08 RX ORDER — HYOSCYAMINE SULFATE 0.12 MG/1
0.12 TABLET SUBLINGUAL EVERY 6 HOURS PRN
Qty: 120 EACH | Refills: 0 | Status: SHIPPED | OUTPATIENT
Start: 2021-10-08 | End: 2022-04-25 | Stop reason: ALTCHOICE

## 2021-10-08 RX ORDER — TAMSULOSIN HYDROCHLORIDE 0.4 MG/1
0.4 CAPSULE ORAL DAILY
Qty: 14 CAPSULE | Refills: 0 | Status: SHIPPED | OUTPATIENT
Start: 2021-10-08 | End: 2022-04-25 | Stop reason: ALTCHOICE

## 2021-10-08 RX ORDER — LEVOFLOXACIN 750 MG/1
750 TABLET ORAL DAILY
Qty: 5 TABLET | Refills: 0 | Status: CANCELLED | OUTPATIENT
Start: 2021-10-08 | End: 2021-10-13

## 2021-10-08 RX ORDER — CEFDINIR 300 MG/1
300 CAPSULE ORAL 2 TIMES DAILY
Qty: 14 CAPSULE | Refills: 0 | Status: SHIPPED | OUTPATIENT
Start: 2021-10-08 | End: 2021-10-15

## 2021-10-08 RX ADMIN — HYDROMORPHONE HYDROCHLORIDE 1 MG: 1 INJECTION, SOLUTION INTRAMUSCULAR; INTRAVENOUS; SUBCUTANEOUS at 14:25

## 2021-10-08 ASSESSMENT — PAIN SCALES - GENERAL: PAINLEVEL_OUTOF10: 2

## 2021-10-08 NOTE — ED PROVIDER NOTES
[facility] ED  Emergency Department Encounter  EmergencyMedicine Resident     Pt Name:Aneudy Armenta  MRN: [id number]  [place]fjack 1985  Date of evaluation: 10/8/21  PCP:  Frances Stover DO    This patient was evaluated in the Emergency Department for symptoms described in the history of present illness. The patient was evaluated in the context of the global COVID-19 pandemic, which necessitated consideration that the patient might be at risk for infection with the SARS-CoV-2 virus that causes COVID-19. Institutional protocols and algorithms that pertain to the evaluation of patients at risk for COVID-19 are in a state of rapid change based on information released by regulatory bodies including the CDC and federal and state organizations. These policies and algorithms were followed during the patient's care in the ED. CHIEF COMPLAINT       Chief Complaint   Patient presents with    Urinary Retention       HISTORY OF PRESENT ILLNESS  (Location/Symptom, Timing/Onset, Context/Setting, Quality, Duration, Modifying Factors, Severity.)      Antwon Evans is a 29 y.o. male who presents with sensation of bladder fullness and inability to urinate. Patient was a transfer from an outlying ED with a known history of urethral stricture. CT abdomen showed urinary retention > 200 cc with bladder diverticulum. At the outlying ED they attempted to pass a 16 and 14 catheter, and were unable to pass the catheters. Patient had been taking doxycycline and pyridium without symptom improvement. He endorses only being able to urinate dribbles, and has severe abdominal pain. Denies fevers, back or flank pain, fecal retention or incontinence, hematuria, saddle anesthesia, chest pain, shortness of breath. PAST MEDICAL / SURGICAL / SOCIAL / FAMILY HISTORY      has a past medical history of Knee pain, bilateral and Urethral stricture.      has a past surgical history that includes fracture surgery (Right); Urethra dilation; and lymph node biopsy (2014). Social History     Socioeconomic History    Marital status:      Spouse name: Not on file    Number of children: Not on file    Years of education: Not on file    Highest education level: Not on file   Occupational History    Not on file   Tobacco Use    Smoking status: Current Every Day Smoker     Packs/day: 0.50     Years: 15.00     Pack years: 7.50     Types: Cigarettes     Last attempt to quit: 8/3/2015     Years since quittin.1    Smokeless tobacco: Current User     Types: Chew    Tobacco comment: Started snoking    Substance and Sexual Activity    Alcohol use: Yes     Comment: 1 alcoholic beverage per day.  Drug use: No    Sexual activity: Yes     Partners: Female   Other Topics Concern    Not on file   Social History Narrative    Not on file     Social Determinants of Health     Financial Resource Strain: Low Risk     Difficulty of Paying Living Expenses: Not hard at all   Food Insecurity: No Food Insecurity    Worried About Running Out of Food in the Last Year: Never true    [address] in the Last Year: Never true   Transportation Needs:     Lack of Transportation (Medical):      Lack of Transportation (Non-Medical):    Physical Activity:     Days of Exercise per Week:     Minutes of Exercise per Session:    Stress:     Feeling of Stress :    Social Connections:     Frequency of Communication with Friends and Family:     Frequency of Social Gatherings with Friends and Family:     Attends Nondenominational Services:     Active Member of Clubs or Organizations:     Attends Club or Organization Meetings:     Marital Status:    Intimate Partner Violence:     Fear of Current or Ex-Partner:     Emotionally Abused:     Physically Abused:     Sexually Abused:        Family History   Problem Relation Age of Onset    Hypertension Maternal Grandfather        Allergies:  Sulfa antibiotics and Sulfamethoxazole-trimethoprim    Home Medications:  Prior to Admission medications    Medication Sig Start Date End Date Taking? Authorizing Provider   Hyoscyamine Sulfate SL (LEVSIN/SL) 0.125 MG SUBL Place 0.125 mg under the tongue every 6 hours as needed (pain) 10/8/21  Yes Amy Balderrama MD   ciprofloxacin (CIPRO) 500 MG tablet Take 1 tablet by mouth 2 times daily for 7 days 10/8/21 10/15/21 Yes Amy Balderrama MD   cefdinir (OMNICEF) 300 MG capsule Take 1 capsule by mouth 2 times daily for 7 days 10/8/21 10/15/21  JAYESH Valencia - CNP   tamsulosin [facility]) 0.4 MG capsule Take 1 capsule by mouth daily 10/8/21   JAYESH Valencia CNP   doxycycline hyclate (VIBRA-TABS) 100 MG tablet Take 1 tablet by mouth 2 times daily for 10 days 10/5/21 10/15/21  [facility], DO   phenazopyridine (PYRIDIUM) 200 MG tablet Take 1 tablet by mouth 3 times daily as needed for Pain 10/5/21 10/12/21  [facility], DO       REVIEW OF SYSTEMS    (2-9 systems for level 4, 10 or more for level 5)      Review of Systems   Constitutional: Negative for activity change, appetite change and fever. HENT: Negative. Eyes: Negative for visual disturbance. Respiratory: Negative for cough and shortness of breath. Cardiovascular: Negative for chest pain and palpitations. Gastrointestinal: Positive for abdominal pain. Negative for diarrhea, nausea and vomiting. Genitourinary: Positive for decreased urine volume and difficulty urinating. Negative for dysuria, flank pain, frequency, hematuria and urgency. Musculoskeletal: Negative for arthralgias and myalgias. Skin: Negative for pallor and rash. Neurological: Negative for dizziness, weakness, light-headedness and headaches. All other systems reviewed and are negative.       PHYSICAL EXAM   (up to 7 for level 4, 8 or more for level 5)      INITIAL VITALS:   /75   Pulse 83   Temp 98.2 °F (36.8 °C) (Oral)   Resp 18   SpO2 93%     Physical Exam  Vitals reviewed. HENT:      Head: Normocephalic and atraumatic. Right Ear: Tympanic membrane normal.      Left Ear: Tympanic membrane normal.      Nose: Nose normal.      Mouth/Throat:      Mouth: Mucous membranes are moist.      Pharynx: Oropharynx is clear. Eyes:      Extraocular Movements: Extraocular movements intact. Conjunctiva/sclera: Conjunctivae normal.      Pupils: Pupils are equal, round, and reactive to light. Cardiovascular:      Rate and Rhythm: Normal rate and regular rhythm. Heart sounds: Normal heart sounds. No murmur heard. Pulmonary:      Effort: Pulmonary effort is normal.      Breath sounds: Normal breath sounds. Abdominal:      General: There is distension. Palpations: Abdomen is soft. Tenderness: There is abdominal tenderness (Suprapubic tenderness). There is no right CVA tenderness, left CVA tenderness, guarding or rebound. Musculoskeletal:         General: Normal range of motion. Cervical back: Neck supple. Skin:     Capillary Refill: Capillary refill takes less than 2 seconds. Coloration: Skin is not jaundiced or pale. Findings: No rash. Neurological:      General: No focal deficit present. Mental Status: He is alert and oriented to person, place, and time.        DIFFERENTIAL  DIAGNOSIS     PLAN (LABS / IMAGING / EKG):  Orders Placed This Encounter   Procedures    Urinary leg bag    BASIC METABOLIC PANEL    Bladder scan    Inpatient consult to Urology    Insert peripheral IV       MEDICATIONS ORDERED:  Orders Placed This Encounter   Medications    DISCONTD: lidocaine (XYLOCAINE) 2 % uro-jet    HYDROmorphone (DILAUDID) injection 1 mg    Hyoscyamine Sulfate SL (LEVSIN/SL) 0.125 MG SUBL     Sig: Place 0.125 mg under the tongue every 6 hours as needed (pain)     Dispense:  120 each     Refill:  0    ciprofloxacin (CIPRO) 500 MG tablet     Sig: Take 1 tablet by mouth 2 times daily for 7 days     Dispense:  14 tablet Refill:  0     DDX: Urinary retention vs urethral stricture vs bladder diverticulum vs obstruction    DIAGNOSTIC RESULTS / EMERGENCY DEPARTMENT COURSE / MDM   LAB RESULTS:  Results for orders placed or performed during the hospital encounter of 45/76/49   BASIC METABOLIC PANEL   Result Value Ref Range    Glucose 94 70 - 99 mg/dL    BUN 15 6 - 20 mg/dL    CREATININE 0.78 0.70 - 1.20 mg/dL    Bun/Cre Ratio NOT REPORTED 9 - 20    Calcium 9.5 8.6 - 10.4 mg/dL    Sodium 136 135 - 144 mmol/L    Potassium 3.9 3.7 - 5.3 mmol/L    Chloride 102 98 - 107 mmol/L    CO2 24 20 - 31 mmol/L    Anion Gap 10 9 - 17 mmol/L    GFR Non-African American >60 >60 mL/min    GFR African American >60 >60 mL/min    GFR Comment          GFR Staging NOT REPORTED        IMPRESSION: BMP unremarkable    RADIOLOGY:  CT ABDOMEN PELVIS WO CONTRAST Additional Contrast? None    Result Date: 10/5/2021  EXAMINATION: CT OF THE ABDOMEN AND PELVIS WITHOUT CONTRAST 10/5/2021 11:07 am TECHNIQUE: CT of the abdomen and pelvis was performed without the administration of intravenous contrast. Multiplanar reformatted images are provided for review. Dose modulation, iterative reconstruction, and/or weight based adjustment of the mA/kV was utilized to reduce the radiation dose to as low as reasonably achievable. COMPARISON: None. HISTORY: ORDERING SYSTEM PROVIDED HISTORY: Other microscopic hematuria TECHNOLOGIST PROVIDED HISTORY: recurrent UTI, urinary retention, hematuria, lower abdominal pressure FINDINGS: LOWER CHEST: No focal abnormality. KIDNEYS AND URINARY TRACT: No renal calculi are identified. There is no evidence for hydronephrosis. The ureters are of normal course and caliber. ORGANS: Lack of intravenous contrast limits evaluation of the solid organs and bowel. The liver, gallbladder, pancreas, spleen, and adrenals reveal no acute abnormality. GI/BOWEL: No bowel dilatation or wall thickening. Normal appendix. PELVIS: No acute findings.   The bladder is well distended. Wide mouth posterior bladder diverticula are present measuring 2 cm in width on the left and 2.9 cm on the right. The prostate contains coarse calcification and does not appear significantly enlarged. PERITONEUM/RETROPERITONEUM: No lymphadenopathy is noted. No free air or free fluid. The aorta is normal in caliber. BONES/SOFT TISSUES: No acute osseous abnormality is identified. 1.  Distended bladder without wall thickening or surrounding inflammatory change. Posterior bladder diverticula. 2.  No renal calculi or hydronephrosis identified. EMERGENCY DEPARTMENT COURSE:     This patient presented with urinary retention and inability for [facility] to pass a mancia catheter. CT abdomen pelvis on 10/5 showed distended bladder without wall thickening or surrounding inflammatory change, and posterior bladder diverticula without renal calculi or hydronephrosis. Patient was transferred to us from [facility] ED after they were unable to pass a Mancia catheter, and on presentation to the ED today, patient had significant abdominal distention and suprapubic tenderness with almost no urine passed in the past 24 hours. Bladder scan at bedside showed 900 cc of urine. Urology paged urgently for consult, Kavya Aly placed at bedside, and urology was able to insert a mancia catheter at bedside. Patient discharged on ciprofloxacin and Levsin per urology instructions, and given instruction on Mancia catheter use and emptying and a leg bag. Patient to follow-up with urology this week. Discussed return precautions, including inability to pass urine, hematuria, feeling of bladder fullness or abdominal distention, fevers, dysuria, other concerns. Patient voiced understanding. PROCEDURES:  Insertion of mancia catheter at bedside by urology    CONSULTS:  [address]      1.  Urinary retention          DISPOSITION / PLAN     DISPOSITION Decision To Discharge 10/08/2021 03:38:04 PM      PATIENT REFERRED TO:  Rene Rivera MD  [facility]  [phone]    Go on 10/13/2021  For catheter removal and voiding trial    [facility]  [address] [address]  [phone]  Go today  If symptoms worsen, As needed      DISCHARGE MEDICATIONS:  Discharge Medication List as of 10/8/2021  3:43 PM      START taking these medications    Details   Hyoscyamine Sulfate SL (LEVSIN/SL) 0.125 MG SUBL Place 0.125 mg under the tongue every 6 hours as needed (pain), Disp-120 each, R-0Print      ciprofloxacin (CIPRO) 500 MG tablet Take 1 tablet by mouth 2 times daily for 7 days, Disp-14 tablet, R-0Print             Merissa Salter MD  Emergency Medicine Resident    (Please note that portions of thisnote were completed with a voice recognition program.  Efforts were made to edit the dictations but occasionally words are mis-transcribed.)       Merissa Salter MD  Resident  10/11/21 1944

## 2021-10-08 NOTE — PATIENT INSTRUCTIONS
Stop doxycycline. Start cefdinir and flomax  Keep urine catheter in place until urology follow up on Wednesday.

## 2021-10-08 NOTE — ED NOTES
Pt went to restroom to void, output 10mls, writer bladder scanned pt afterwards, scan revealed >909mls. Pt c/o pressure.      Greyson Taylor, RN  10/08/21 3731

## 2021-10-08 NOTE — PROGRESS NOTES
Subjective:      Patient ID: Sandie Johnson is a 29 y.o. male coming in for   Chief Complaint   Patient presents with    Abdominal Pain     abd pain. saw Александр Michael in  a few days ago. not sleeping well. sees urology on wednesday next week. rates pain 8/10 today        Presents to  for complaints of urinary retention and bladder pain. Symptoms ongoing for over the past month but worse over the past week. Pt has history of urethral stricture when he was younger, unsure of exact age. Had to have procedure to fix this. Pt did have CT abd/pelvis 10/5/21:1.  Distended bladder without wall thickening or surrounding inflammatory change.  Posterior bladder diverticula. 2.  No renal calculi or hydronephrosis identified. Pt discharged from  and started on pyridium and doxycycline. Pt reports symptoms continue to persist with no improvement. He is able to urinate slightly but just dribbles. Bladder scan done here showed >200ml. Pt has distended lower abd/suprapubic region with pain. Attempted to cath patient with 18f coude catheter and a 14 Marshallese catheter, both were unsuccessful. At this time the urology nurse Moshe Aguila, is contacting urology dept. Pt is to go to [facility]'s ER to be further evaluated. Review of Systems     Objective:  /82   Pulse 84   Temp 97.3 °F (36.3 °C)   Resp 18   Ht 5' 6.5\" (1.689 m)   Wt 134 lb 9.6 oz (61.1 kg)   SpO2 97%   BMI 21.40 kg/m²      Physical Exam  Vitals and nursing note reviewed. Cardiovascular:      Rate and Rhythm: Normal rate and regular rhythm. Heart sounds: Normal heart sounds. Pulmonary:      Breath sounds: Normal breath sounds. Abdominal:      General: Bowel sounds are normal. There is distension. Palpations: Abdomen is soft. Tenderness: There is abdominal tenderness in the suprapubic area. Skin:     General: Skin is warm and dry. Neurological:      General: No focal deficit present.       Mental Status: He is alert and oriented to person, place, and time. Assessment:      1. Bladder distension    2. Urinary retention with incomplete bladder emptying           Plan:      No orders of the defined types were placed in this encounter. Outpatient Encounter Medications as of 10/8/2021   Medication Sig Dispense Refill    cefdinir (OMNICEF) 300 MG capsule Take 1 capsule by mouth 2 times daily for 7 days 14 capsule 0    tamsulosin (FLOMAX) 0.4 MG capsule Take 1 capsule by mouth daily 14 capsule 0    doxycycline hyclate (VIBRA-TABS) 100 MG tablet Take 1 tablet by mouth 2 times daily for 10 days 20 tablet 0    phenazopyridine (PYRIDIUM) 200 MG tablet Take 1 tablet by mouth 3 times daily as needed for Pain 21 tablet 0     No facility-administered encounter medications on file as of 10/8/2021.             JAYESH Munson - CNP

## 2021-10-08 NOTE — CONSULTS
Sherif Donahue, [address] [place] [address], [place], [facility] [place]   Urology consultation      Patient:  Moris Hawkins  MRN: [id number]  YOB: 1985    CHIEF COMPLAINT: Acute urinary retention    HISTORY OF PRESENT ILLNESS:   The patient is a 29 y.o. male who presents with acute urinary retention of greater than 250 cc. Patient notes that he has been having worsening LUTS with post void dribbling, sensation of incomplete emptying, urinary urgency, frequency over the last week. He notes that he is currently unable to initiate voiding with weak stream and splitting of urinary stream.  He notes prior history of urethral stricture that required surgical repair in the past.  He has had 2 UTIs in the past.  Prior CT abdomen pelvis showed a distended bladder with no hydronephrosis seen. Patient's old records, notes and chart reviewed and summarized above. Past Medical History:    Past Medical History:   Diagnosis Date    Knee pain, bilateral     Intermittent.  Urethral stricture        Past Surgical History:    Past Surgical History:   Procedure Laterality Date    FRACTURE SURGERY Right     ORIF radius.  LYMPH NODE BIOPSY  02/17/2014    non malignant    URETHRAL STRICTURE DILATATION         Medications:      Current Facility-Administered Medications:     lidocaine (XYLOCAINE) 2 % uro-jet, , Topical, Once, Yarelis Wade MD    Current Outpatient Medications:     cefdinir (OMNICEF) 300 MG capsule, Take 1 capsule by mouth 2 times daily for 7 days, Disp: 14 capsule, Rfl: 0    tamsulosin (FLOMAX) 0.4 MG capsule, Take 1 capsule by mouth daily, Disp: 14 capsule, Rfl: 0    doxycycline hyclate (VIBRA-TABS) 100 MG tablet, Take 1 tablet by mouth 2 times daily for 10 days, Disp: 20 tablet, Rfl: 0    phenazopyridine (PYRIDIUM) 200 MG tablet, Take 1 tablet by mouth 3 times daily as needed for Pain, Disp: 21 tablet, Rfl: 0    Allergies:     Allergies   Allergen Reactions    Sulfa Antibiotics Swelling     Severe swelling.  Sulfamethoxazole-Trimethoprim Swelling       Social History:   Social History     Socioeconomic History    Marital status:      Spouse name: Not on file    Number of children: Not on file    Years of education: Not on file    Highest education level: Not on file   Occupational History    Not on file   Tobacco Use    Smoking status: Current Every Day Smoker     Packs/day: 0.50     Years: 15.00     Pack years: 7.50     Types: Cigarettes     Last attempt to quit: 8/3/2015     Years since quittin.1    Smokeless tobacco: Current User     Types: Chew    Tobacco comment: Started snoking    Substance and Sexual Activity    Alcohol use: Yes     Comment: 1 alcoholic beverage per day.  Drug use: No    Sexual activity: Yes     Partners: Female   Other Topics Concern    Not on file   Social History Narrative    Not on file     Social Determinants of Health     Financial Resource Strain: Low Risk     Difficulty of Paying Living Expenses: Not hard at all   Food Insecurity: No Food Insecurity    Worried About Running Out of Food in the Last Year: Never true    [address] in the Last Year: Never true   Transportation Needs:     Lack of Transportation (Medical):      Lack of Transportation (Non-Medical):    Physical Activity:     Days of Exercise per Week:     Minutes of Exercise per Session:    Stress:     Feeling of Stress :    Social Connections:     Frequency of Communication with Friends and Family:     Frequency of Social Gatherings with Friends and Family:     Attends Yazdan[employer] Services:     Active Member of Clubs or Organizations:     Attends Club or Organization Meetings:     Marital Status:    Intimate Partner Violence:     Fear of Current or Ex-Partner:     Emotionally Abused:     Physically Abused:     Sexually Abused:        Family History:    Family History   Problem Relation Age of Onset    Hypertension Maternal Grandfather and Plan   Impression:  Acute urinary retention  History of urethral stricture  Recurrent urinary tract infection      Plan:  -Send UA, urine culture and treat with culture sensitive antibiotics.   Based on prior cultures, will discharge patient home on Bactrim or Keflex  -At the bedside, patient was noted to have a pinpoint approximately 8 Western Ximena mid urethral stricture which was dilated using S-curve dilators to 21 Western Ximena with successful placement of 16 [place] coudé [place] tip catheter.  -Follow-up as scheduled next Wednesday with Dr. Lala Winters MD  1:50 PM 10/8/2021

## 2021-10-08 NOTE — ED NOTES
Pt mancia bag changed to leg bag, output of 1100mls tea colored urine emptied.      Antionette Watters RN  10/08/21 [id number]

## 2021-10-08 NOTE — ED TRIAGE NOTES
Pt c/o urinary retention x 2-3 weeks. Pt states,\"Im just getting dribbles\". C/o pressure, \"no pain\".

## 2021-10-11 ENCOUNTER — TELEPHONE (OUTPATIENT)
Dept: FAMILY MEDICINE CLINIC | Age: 36
End: 2021-10-11

## 2021-10-11 ENCOUNTER — TELEPHONE (OUTPATIENT)
Dept: UROLOGY | Age: 36
End: 2021-10-11

## 2021-10-11 ASSESSMENT — ENCOUNTER SYMPTOMS
COUGH: 0
DIARRHEA: 0
NAUSEA: 0
SHORTNESS OF BREATH: 0
ABDOMINAL PAIN: 1
VOMITING: 0

## 2021-10-11 NOTE — TELEPHONE ENCOUNTER
Contacted patient, let him know there is nothing else we could advise, he can get azo over the counter, will see him wed 10/13/21

## 2021-10-11 NOTE — TELEPHONE ENCOUNTER
Patient was seen at [place] [address] on 10/8/2021 for urinary retention. Patient had a catheter placed and was prescribed hyoscyamine sulfate and ciprofloxacin. Patient states the hyoscyamine isn't helping with the pain and is asking what else he can take. He is schedule with Dr. Angie Tomlin on 10/13/2021. Please call patient back at [phone].

## 2021-10-13 ENCOUNTER — OFFICE VISIT (OUTPATIENT)
Dept: UROLOGY | Age: 36
End: 2021-10-13
Payer: COMMERCIAL

## 2021-10-13 VITALS
OXYGEN SATURATION: 97 % | DIASTOLIC BLOOD PRESSURE: 62 MMHG | SYSTOLIC BLOOD PRESSURE: 132 MMHG | HEIGHT: 67 IN | WEIGHT: 134 LBS | BODY MASS INDEX: 21.03 KG/M2 | HEART RATE: 92 BPM

## 2021-10-13 DIAGNOSIS — N35.912 BULBOUS URETHRAL STRICTURE: Primary | ICD-10-CM

## 2021-10-13 DIAGNOSIS — N32.3 BLADDER DIVERTICULUM: ICD-10-CM

## 2021-10-13 DIAGNOSIS — R33.9 URINARY RETENTION: ICD-10-CM

## 2021-10-13 PROCEDURE — G8427 DOCREV CUR MEDS BY ELIG CLIN: HCPCS | Performed by: UROLOGY

## 2021-10-13 PROCEDURE — 99214 OFFICE O/P EST MOD 30 MIN: CPT | Performed by: UROLOGY

## 2021-10-13 PROCEDURE — 4004F PT TOBACCO SCREEN RCVD TLK: CPT | Performed by: UROLOGY

## 2021-10-13 PROCEDURE — G8420 CALC BMI NORM PARAMETERS: HCPCS | Performed by: UROLOGY

## 2021-10-13 PROCEDURE — 99204 OFFICE O/P NEW MOD 45 MIN: CPT | Performed by: UROLOGY

## 2021-10-13 PROCEDURE — 99213 OFFICE O/P EST LOW 20 MIN: CPT | Performed by: UROLOGY

## 2021-10-13 PROCEDURE — G8482 FLU IMMUNIZE ORDER/ADMIN: HCPCS | Performed by: UROLOGY

## 2021-10-13 RX ORDER — PHENAZOPYRIDINE HYDROCHLORIDE 200 MG/1
200 TABLET, FILM COATED ORAL 3 TIMES DAILY PRN
Qty: 9 TABLET | Refills: 0 | Status: SHIPPED | OUTPATIENT
Start: 2021-10-13 | End: 2022-04-25 | Stop reason: ALTCHOICE

## 2021-10-13 NOTE — PROGRESS NOTES
Nilesh Mclaughlin MD   Urology Clinic office Visit      Patient:  Michelle Singletary  YOB: 1985  Date: 10/13/2021    HISTORY OF PRESENT ILLNESS:   The patient is a 29 y.o. male who presents today for evaluation of the following problem(s):      1. Bulbous urethral stricture    2. Urinary retention    3. Bladder diverticulum           Overall the problem(s) : are worsening. Associated Symptoms: No dysuria, gross hematuria. Pain Severity: Pain Score:   0 - No pain    Summary of old records: had stricture disease in the past, had surgery, not sure what type- 20 years ago  (Patient's old records, notes and chart reviewed and summarized above.)      1 week ago noticed could not urinate   1 week prior had unable to initiate voiding with weak stream and splitting of urinary stream.  CT abdomen pelvis showed a distended bladder with no hydronephrosis seen. Was given flomax  2 days later had to have a mancia, went to [facility] for that  Needed to be dilated, noted to have pinpoint stricture    I independently reviewed and verified the images and reports from:    10/5/2021  Wide mouth   posterior bladder diverticula are present measuring 2 cm in width on the left   and 2.9 cm on the right.  The prostate contains coarse calcification and does   not appear significantly enlarged. Last several PSA's:  No results found for: PSA    Last total testosterone:  No results found for: TESTOSTERONE    Urinalysis today:  No results found for this visit on 10/13/21. Last BUN and creatinine:  Lab Results   Component Value Date    BUN 15 10/08/2021     Lab Results   Component Value Date    CREATININE 0.78 10/08/2021       Imaging Reviewed during this Office Visit:   (results were independently reviewed by physician and radiology report verified)    PAST MEDICAL, FAMILY AND SOCIAL HISTORY:  Past Medical History:   Diagnosis Date    Knee pain, bilateral     Intermittent.     Urethral stricture      Past Surgical Constitutional: Patient in no acute distress; Neuro: alert and oriented to person place and time. Psych: Mood and affect normal.  Skin: Normal  Lungs: Respiratory effort normal  Cardiovascular:  Normal peripheral pulses  Abdomen: Soft, non-tender, non-distended   Bladder non-tender and not distended. Lymphatics: no palpable lymphadenopathy  Gait is within normal limits  Musculoskeletal: Normal range of motion       Assessment and Plan      1. Bulbous urethral stricture    2. Urinary retention    3. Bladder diverticulum           Remove mancia today    Follow up in 3 month and cystoscopy    Prescriptions Ordered:  Orders Placed This Encounter   Medications    phenazopyridine (PYRIDIUM) 200 MG tablet     Sig: Take 1 tablet by mouth 3 times daily as needed for Pain     Dispense:  9 tablet     Refill:  0               Jade Mijares M.D, MD    Return in about 3 months (around 1/13/2022).       Malik Jacob MD  [facility]

## 2022-01-19 ENCOUNTER — PROCEDURE VISIT (OUTPATIENT)
Dept: UROLOGY | Age: 37
End: 2022-01-19
Payer: COMMERCIAL

## 2022-01-19 VITALS
HEART RATE: 90 BPM | BODY MASS INDEX: 21.03 KG/M2 | HEIGHT: 67 IN | DIASTOLIC BLOOD PRESSURE: 80 MMHG | SYSTOLIC BLOOD PRESSURE: 120 MMHG | WEIGHT: 134 LBS

## 2022-01-19 DIAGNOSIS — N35.912 BULBOUS URETHRAL STRICTURE: Primary | ICD-10-CM

## 2022-01-19 PROCEDURE — 52000 CYSTOURETHROSCOPY: CPT | Performed by: UROLOGY

## 2022-01-19 RX ORDER — CIPROFLOXACIN 500 MG/1
500 TABLET, FILM COATED ORAL 2 TIMES DAILY
Qty: 6 TABLET | Refills: 0 | Status: SHIPPED | OUTPATIENT
Start: 2022-01-19 | End: 2022-01-22

## 2022-01-19 NOTE — PROGRESS NOTES
Cystoscopy Operative Note  Surgeon: Bryon Hudson M.D, MD   Anesthesia: Urethral 2%  Indications: urethral stricture  Position: supine  Findings:   The patient was prepped and draped in the usual sterile fashion. The flexible cystoscope was advanced through the urethra and into the bladder. The bladder was thoroughly inspected and the following was noted:      Fossa narrowing passed and pushed through with scope    Soft stricture at bulbar urethra 0.5-1cm, just distal to sphincter  Normal prostate, open  Bladder normal  Debris/possible UTI    The cystoscope was removed. The patient tolerated the procedure well.         Cipro x 3 days  Increase fluids  Voiding well    Fu 6 months

## 2022-04-12 ENCOUNTER — OFFICE VISIT (OUTPATIENT)
Dept: OTOLARYNGOLOGY | Age: 37
End: 2022-04-12
Payer: COMMERCIAL

## 2022-04-12 VITALS
WEIGHT: 135.4 LBS | HEART RATE: 69 BPM | HEIGHT: 67 IN | SYSTOLIC BLOOD PRESSURE: 108 MMHG | RESPIRATION RATE: 18 BRPM | DIASTOLIC BLOOD PRESSURE: 70 MMHG | OXYGEN SATURATION: 97 % | BODY MASS INDEX: 21.25 KG/M2

## 2022-04-12 DIAGNOSIS — R09.81 NASAL CONGESTION: ICD-10-CM

## 2022-04-12 DIAGNOSIS — S02.2XXA CLOSED FRACTURE OF NASAL BONE, INITIAL ENCOUNTER: Primary | ICD-10-CM

## 2022-04-12 PROCEDURE — 99204 OFFICE O/P NEW MOD 45 MIN: CPT | Performed by: OTOLARYNGOLOGY

## 2022-04-12 PROCEDURE — 99212 OFFICE O/P EST SF 10 MIN: CPT | Performed by: OTOLARYNGOLOGY

## 2022-04-12 PROCEDURE — 4004F PT TOBACCO SCREEN RCVD TLK: CPT | Performed by: OTOLARYNGOLOGY

## 2022-04-12 PROCEDURE — G8420 CALC BMI NORM PARAMETERS: HCPCS | Performed by: OTOLARYNGOLOGY

## 2022-04-12 PROCEDURE — G8427 DOCREV CUR MEDS BY ELIG CLIN: HCPCS | Performed by: OTOLARYNGOLOGY

## 2022-04-12 RX ORDER — CLINDAMYCIN HYDROCHLORIDE 300 MG/1
300 CAPSULE ORAL 4 TIMES DAILY
COMMUNITY
Start: 2022-04-12 | End: 2022-04-22

## 2022-04-12 RX ORDER — IBUPROFEN 800 MG/1
800 TABLET ORAL EVERY 6 HOURS PRN
COMMUNITY
Start: 2022-04-12 | End: 2022-05-12
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
## 2022-04-19 ENCOUNTER — OFFICE VISIT (OUTPATIENT)
Dept: OTOLARYNGOLOGY | Age: 37
End: 2022-04-19
Payer: COMMERCIAL

## 2022-04-19 VITALS
SYSTOLIC BLOOD PRESSURE: 108 MMHG | WEIGHT: 135.4 LBS | HEIGHT: 67 IN | HEART RATE: 76 BPM | DIASTOLIC BLOOD PRESSURE: 68 MMHG | OXYGEN SATURATION: 92 % | RESPIRATION RATE: 16 BRPM | BODY MASS INDEX: 21.25 KG/M2

## 2022-04-19 DIAGNOSIS — S02.2XXA CLOSED FRACTURE OF NASAL BONE, INITIAL ENCOUNTER: Primary | ICD-10-CM

## 2022-04-19 PROCEDURE — G8427 DOCREV CUR MEDS BY ELIG CLIN: HCPCS | Performed by: OTOLARYNGOLOGY

## 2022-04-19 PROCEDURE — 4004F PT TOBACCO SCREEN RCVD TLK: CPT | Performed by: OTOLARYNGOLOGY

## 2022-04-19 PROCEDURE — 99213 OFFICE O/P EST LOW 20 MIN: CPT | Performed by: OTOLARYNGOLOGY

## 2022-04-19 PROCEDURE — G8420 CALC BMI NORM PARAMETERS: HCPCS | Performed by: OTOLARYNGOLOGY

## 2022-04-19 PROCEDURE — 99212 OFFICE O/P EST SF 10 MIN: CPT | Performed by: OTOLARYNGOLOGY

## 2022-04-19 RX ORDER — MOMETASONE FUROATE 50 UG/1
2 SPRAY, METERED NASAL DAILY
Qty: 1 EACH | Refills: 1 | Status: SHIPPED | OUTPATIENT
Start: 2022-04-19 | End: 2022-07-20 | Stop reason: ALTCHOICE
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
## 2022-04-25 ENCOUNTER — OFFICE VISIT (OUTPATIENT)
Dept: FAMILY MEDICINE CLINIC | Age: 37
End: 2022-04-25
Payer: COMMERCIAL

## 2022-04-25 ENCOUNTER — HOSPITAL ENCOUNTER (OUTPATIENT)
Dept: GENERAL RADIOLOGY | Age: 37
Discharge: HOME | End: 2022-04-27
Payer: COMMERCIAL

## 2022-04-25 VITALS
SYSTOLIC BLOOD PRESSURE: 110 MMHG | OXYGEN SATURATION: 98 % | BODY MASS INDEX: 21.19 KG/M2 | HEIGHT: 67 IN | RESPIRATION RATE: 16 BRPM | TEMPERATURE: 97 F | WEIGHT: 135 LBS | DIASTOLIC BLOOD PRESSURE: 80 MMHG | HEART RATE: 84 BPM

## 2022-04-25 DIAGNOSIS — S02.2XXA CLOSED FRACTURE OF NASAL BONE, INITIAL ENCOUNTER: ICD-10-CM

## 2022-04-25 DIAGNOSIS — M79.642 LEFT HAND PAIN: ICD-10-CM
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
## 2022-04-25 DIAGNOSIS — R68.84 JAW PAIN: Primary | ICD-10-CM

## 2022-04-25 PROCEDURE — 4004F PT TOBACCO SCREEN RCVD TLK: CPT | Performed by: FAMILY MEDICINE

## 2022-04-25 PROCEDURE — G8420 CALC BMI NORM PARAMETERS: HCPCS | Performed by: FAMILY MEDICINE

## 2022-04-25 PROCEDURE — 99214 OFFICE O/P EST MOD 30 MIN: CPT | Performed by: FAMILY MEDICINE

## 2022-04-25 PROCEDURE — G8427 DOCREV CUR MEDS BY ELIG CLIN: HCPCS | Performed by: FAMILY MEDICINE

## 2022-04-25 PROCEDURE — 73130 X-RAY EXAM OF HAND: CPT

## 2022-04-25 PROCEDURE — 99212 OFFICE O/P EST SF 10 MIN: CPT | Performed by: FAMILY MEDICINE

## 2022-04-25 RX ORDER — PREDNISONE 20 MG/1
TABLET ORAL
Qty: 15 TABLET | Refills: 0 | Status: SHIPPED | OUTPATIENT
Start: 2022-04-25 | End: 2022-07-20 | Stop reason: ALTCHOICE

## 2022-04-25 ASSESSMENT — ENCOUNTER SYMPTOMS
SORE THROAT: 1
BACK PAIN: 0
WHEEZING: 0
RHINORRHEA: 0
CHEST TIGHTNESS: 0
SINUS PAIN: 1
SINUS PRESSURE: 0
FACIAL SWELLING: 0
SHORTNESS OF BREATH: 0
TROUBLE SWALLOWING: 0
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
## 2022-05-17 ENCOUNTER — OFFICE VISIT (OUTPATIENT)
Dept: OTOLARYNGOLOGY | Age: 37
End: 2022-05-17
Payer: COMMERCIAL

## 2022-05-17 VITALS
DIASTOLIC BLOOD PRESSURE: 60 MMHG | WEIGHT: 134.6 LBS | HEART RATE: 64 BPM | BODY MASS INDEX: 21.12 KG/M2 | RESPIRATION RATE: 18 BRPM | HEIGHT: 67 IN | OXYGEN SATURATION: 99 % | SYSTOLIC BLOOD PRESSURE: 116 MMHG

## 2022-05-17 DIAGNOSIS — S02.2XXA CLOSED FRACTURE OF NASAL BONE, INITIAL ENCOUNTER: Primary | ICD-10-CM

## 2022-05-17 PROCEDURE — 99213 OFFICE O/P EST LOW 20 MIN: CPT | Performed by: OTOLARYNGOLOGY

## 2022-05-17 PROCEDURE — 99212 OFFICE O/P EST SF 10 MIN: CPT | Performed by: OTOLARYNGOLOGY

## 2022-05-17 PROCEDURE — G8420 CALC BMI NORM PARAMETERS: HCPCS | Performed by: OTOLARYNGOLOGY

## 2022-05-17 PROCEDURE — 4004F PT TOBACCO SCREEN RCVD TLK: CPT | Performed by: OTOLARYNGOLOGY

## 2022-05-17 PROCEDURE — G8427 DOCREV CUR MEDS BY ELIG CLIN: HCPCS | Performed by: OTOLARYNGOLOGY

## 2022-05-17 ASSESSMENT — ENCOUNTER SYMPTOMS: SINUS COMPLAINT: 1
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
## 2022-07-20 ENCOUNTER — OFFICE VISIT (OUTPATIENT)
Dept: UROLOGY | Age: 37
End: 2022-07-20
Payer: COMMERCIAL

## 2022-07-20 VITALS
DIASTOLIC BLOOD PRESSURE: 82 MMHG | BODY MASS INDEX: 20.56 KG/M2 | HEART RATE: 90 BPM | RESPIRATION RATE: 16 BRPM | SYSTOLIC BLOOD PRESSURE: 120 MMHG | WEIGHT: 131 LBS | HEIGHT: 67 IN

## 2022-07-20 DIAGNOSIS — N35.912 BULBOUS URETHRAL STRICTURE: Primary | ICD-10-CM

## 2022-07-20 DIAGNOSIS — N32.3 BLADDER DIVERTICULUM: ICD-10-CM

## 2022-07-20 DIAGNOSIS — R33.9 URINARY RETENTION: ICD-10-CM

## 2022-07-20 PROCEDURE — 99212 OFFICE O/P EST SF 10 MIN: CPT | Performed by: UROLOGY

## 2022-07-20 PROCEDURE — 4004F PT TOBACCO SCREEN RCVD TLK: CPT | Performed by: UROLOGY

## 2022-07-20 PROCEDURE — G8420 CALC BMI NORM PARAMETERS: HCPCS | Performed by: UROLOGY

## 2022-07-20 PROCEDURE — 99213 OFFICE O/P EST LOW 20 MIN: CPT | Performed by: UROLOGY

## 2022-07-20 PROCEDURE — G8427 DOCREV CUR MEDS BY ELIG CLIN: HCPCS | Performed by: UROLOGY

## 2022-07-20 NOTE — PROGRESS NOTES
Yanet Quintero MD   Urology Clinic office Visit      Patient:  Collette Loya  YOB: 1985  Date: 7/20/2022    HISTORY OF PRESENT ILLNESS:   The patient is a 40 y.o. male who presents today for evaluation of the following problem(s):      1. Bulbous urethral stricture    2. Bladder diverticulum    3. Urinary retention             Overall the problem(s) : are  improved  Associated Symptoms: No dysuria, gross hematuria. Pain Severity: Pain Score:   0 - No pain    Summary of old records: had stricture disease in the past, had surgery, not sure what type- 20 years ago  (Patient's old records, notes and chart reviewed and summarized above.)      1 week ago noticed could not urinate   1 week prior had unable to initiate voiding with weak stream and splitting of urinary stream.  CT abdomen pelvis showed a distended bladder with no hydronephrosis seen. Was given flomax  2 days later had to have a mancia, went to [facility] that  Needed to be dilated, noted to have pinpoint stricture    Cystoscopy 1/2022- Soft stricture at bulbar urethra 0.5-1cm, just distal to sphincter  He feels that he empties well, no UTIs, hematuria, dysuria  Here for 6 month follow up    I independently reviewed and verified the images and reports from:    10/5/2021  Wide mouth   posterior bladder diverticula are present measuring 2 cm in width on the left   and 2.9 cm on the right. The prostate contains coarse calcification and does   not appear significantly enlarged. Last several PSA's:  No results found for: PSA    Last total testosterone:  No results found for: TESTOSTERONE    Urinalysis today:  No results found for this visit on 07/20/22.       Last BUN and creatinine:  Lab Results   Component Value Date    BUN 15 10/08/2021     Lab Results   Component Value Date    CREATININE 0.78 10/08/2021       Imaging Reviewed during this Office Visit:   (results were independently reviewed by physician and radiology report verified)    PAST MEDICAL, FAMILY AND SOCIAL HISTORY:  Past Medical History:   Diagnosis Date    Knee pain, bilateral     Intermittent. Urethral stricture      Past Surgical History:   Procedure Laterality Date    FRACTURE SURGERY Right     ORIF radius. LYMPH NODE BIOPSY  2014    non malignant    URETHRAL STRICTURE DILATATION       Family History   Problem Relation Age of Onset    Hypertension Maternal Grandfather      No outpatient medications have been marked as taking for the 22 encounter (Office Visit) with Rene Rivera MD.       Sulfa antibiotics and Sulfamethoxazole-trimethoprim  Social History     Tobacco Use   Smoking Status Every Day    Packs/day: 0.50    Years: 15.00    Pack years: 7.50    Types: Cigarettes    Last attempt to quit: 8/3/2015    Years since quittin.9   Smokeless Tobacco Current    Types: Chew   Tobacco Comments    Started snoking        Social History     Substance and Sexual Activity   Alcohol Use Yes    Comment: 1 alcoholic beverage per day. REVIEW OF SYSTEMS:  Constitutional: negative  Eyes: negative  Respiratory: negative  Cardiovascular: negative  Gastrointestinal: negative  Musculoskeletal: negative  Genitourinary: negative except for what is in HPI  Skin: negative   Neurological: negative  Hematological/Lymphatic: negative  Psychological: negative    Physical Exam:    This a 40 y.o. male   Vitals:    22 0905   BP: 120/82   Pulse: 90   Resp: 16     Constitutional: Patient in no acute distress; Neuro: alert and oriented to person place and time. Psych: Mood and affect normal.      Assessment and Plan      1. Bulbous urethral stricture    2. Bladder diverticulum    3.  Urinary retention             Cystoscopy 2022- Soft stricture at bulbar urethra 0.5-1cm, just distal to sphincter  Doing well  Discussed warning signs of worsening LUTS and signs of progressing stricture    Follow up 1 year    Prescriptions Ordered:  No orders of the defined types were placed in this encounter. Matilde Awad M.D, MD    No follow-ups on file.       Emma Armendariz MD  [facility]

## 2024-05-24 ENCOUNTER — OFFICE VISIT (OUTPATIENT)
Dept: FAMILY MEDICINE CLINIC | Age: 39
End: 2024-05-24
Payer: COMMERCIAL

## 2024-05-24 VITALS
OXYGEN SATURATION: 97 % | HEART RATE: 80 BPM | TEMPERATURE: 97.2 F | RESPIRATION RATE: 18 BRPM | HEIGHT: 67 IN | BODY MASS INDEX: 20.72 KG/M2 | WEIGHT: 132 LBS | SYSTOLIC BLOOD PRESSURE: 110 MMHG | DIASTOLIC BLOOD PRESSURE: 80 MMHG

## 2024-05-24 DIAGNOSIS — Z00.00 ROUTINE GENERAL MEDICAL EXAMINATION AT A HEALTH CARE FACILITY: Primary | ICD-10-CM

## 2024-05-24 DIAGNOSIS — H01.134 ECZEMATOUS DERMATITIS OF UPPER EYELIDS OF BOTH EYES: ICD-10-CM

## 2024-05-24 DIAGNOSIS — M25.532 BILATERAL WRIST PAIN: ICD-10-CM

## 2024-05-24 DIAGNOSIS — H01.131 ECZEMATOUS DERMATITIS OF UPPER EYELIDS OF BOTH EYES: ICD-10-CM

## 2024-05-24 DIAGNOSIS — M25.531 BILATERAL WRIST PAIN: ICD-10-CM

## 2024-05-24 PROCEDURE — 99395 PREV VISIT EST AGE 18-39: CPT | Performed by: FAMILY MEDICINE

## 2024-05-24 RX ORDER — NEOMYCIN SULFATE, POLYMYXIN B SULFATE, BACITRACIN ZINC, HYDROCORTISONE 3.5; 10000; 400; 1 MG/G; [USP'U]/G; [USP'U]/G; MG/G
OINTMENT OPHTHALMIC
Qty: 1 EACH | Refills: 2 | Status: SHIPPED | OUTPATIENT
Start: 2024-05-24

## 2024-05-24 SDOH — ECONOMIC STABILITY: FOOD INSECURITY: WITHIN THE PAST 12 MONTHS, THE FOOD YOU BOUGHT JUST DIDN'T LAST AND YOU DIDN'T HAVE MONEY TO GET MORE.: NEVER TRUE

## 2024-05-24 SDOH — ECONOMIC STABILITY: FOOD INSECURITY: WITHIN THE PAST 12 MONTHS, YOU WORRIED THAT YOUR FOOD WOULD RUN OUT BEFORE YOU GOT MONEY TO BUY MORE.: NEVER TRUE

## 2024-05-24 SDOH — ECONOMIC STABILITY: HOUSING INSECURITY
IN THE LAST 12 MONTHS, WAS THERE A TIME WHEN YOU DID NOT HAVE A STEADY PLACE TO SLEEP OR SLEPT IN A SHELTER (INCLUDING NOW)?: NO

## 2024-05-24 SDOH — ECONOMIC STABILITY: INCOME INSECURITY: HOW HARD IS IT FOR YOU TO PAY FOR THE VERY BASICS LIKE FOOD, HOUSING, MEDICAL CARE, AND HEATING?: NOT HARD AT ALL

## 2024-05-24 ASSESSMENT — PATIENT HEALTH QUESTIONNAIRE - PHQ9
SUM OF ALL RESPONSES TO PHQ QUESTIONS 1-9: 0
SUM OF ALL RESPONSES TO PHQ QUESTIONS 1-9: 0
2. FEELING DOWN, DEPRESSED OR HOPELESS: NOT AT ALL
SUM OF ALL RESPONSES TO PHQ QUESTIONS 1-9: 0
SUM OF ALL RESPONSES TO PHQ9 QUESTIONS 1 & 2: 0
1. LITTLE INTEREST OR PLEASURE IN DOING THINGS: NOT AT ALL
SUM OF ALL RESPONSES TO PHQ QUESTIONS 1-9: 0

## 2024-05-24 ASSESSMENT — ENCOUNTER SYMPTOMS
NAUSEA: 0
WHEEZING: 0
TROUBLE SWALLOWING: 0
EYE DISCHARGE: 0
SHORTNESS OF BREATH: 0
ABDOMINAL PAIN: 0
VOMITING: 0
COUGH: 0
SORE THROAT: 0
EYE REDNESS: 0
RHINORRHEA: 0
DIARRHEA: 0
SINUS PRESSURE: 0
CONSTIPATION: 0

## 2024-05-24 NOTE — PROGRESS NOTES
Patient declines covid, pneumonia, and hep b vaccines. Declines hiv/hep c screenings.  
reviewed.   Constitutional:       General: He is not in acute distress.     Appearance: Normal appearance. He is well-developed. He is not diaphoretic.   HENT:      Head: Normocephalic and atraumatic.      Right Ear: Tympanic membrane, ear canal and external ear normal.      Left Ear: Tympanic membrane, ear canal and external ear normal.      Nose: Nose normal.      Mouth/Throat:      Mouth: Mucous membranes are moist.      Pharynx: Oropharynx is clear. No oropharyngeal exudate.   Eyes:      General:         Right eye: No discharge.         Left eye: No discharge.      Conjunctiva/sclera: Conjunctivae normal.      Pupils: Pupils are equal, round, and reactive to light.      Comments: Bilateral upper eyelids with dryness and peeling.   Neck:      Thyroid: No thyromegaly.   Cardiovascular:      Rate and Rhythm: Normal rate and regular rhythm.      Heart sounds: Normal heart sounds.   Pulmonary:      Effort: Pulmonary effort is normal.      Breath sounds: Normal breath sounds. No wheezing or rales.   Abdominal:      General: Bowel sounds are normal. There is no distension.      Palpations: Abdomen is soft.      Tenderness: There is no abdominal tenderness.   Musculoskeletal:         General: No tenderness.      Cervical back: Normal range of motion and neck supple.      Comments: Moves bilateral wrists in a normal range of motion without difficulty.   Lymphadenopathy:      Cervical: No cervical adenopathy.   Skin:     General: Skin is warm and dry.      Findings: No rash.   Neurological:      Mental Status: He is alert and oriented to person, place, and time.   Psychiatric:         Behavior: Behavior normal.         Thought Content: Thought content normal.         Judgment: Judgment normal.           ASSESSMENT/PLAN:  Encounter Diagnoses   Name Primary?    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

## 2024-08-06 NOTE — ED PROVIDER NOTES
----- Message from Gail Vegas sent at 8/5/2024  8:40 AM CDT -----  X-rays of the wrist and hand reveal high-grade osteoarthritic changes including joint space narrowing, spurring, and sclerosis.  There are no obvious fractures.  Can offer Mobic 7.5 mg as needed daily, if she would like to try this then please send to pharmacy for her.  Most recent kidney function is normal, we will continue to monitor this if we start her on the Mobic.   [facility]  Emergency Department  Faculty Attestation     I performed a history and physical examination of the patient and discussed management with the resident. I reviewed the residents note and agree with the documented findings and plan of care. Any areas of disagreement are noted on the chart. I was personally present for the key portions of any procedures. I have documented in the chart those procedures where I was not present during the key portions. I have reviewed the emergency nurses triage note. I agree with the chief complaint, past medical history, past surgical history, allergies, medications, social and family history as documented unless otherwise noted below. For Physician Assistant/ Nurse Practitioner cases/documentation I have personally evaluated this patient and have completed at least one if not all key elements of the E/M (history, physical exam, and MDM). Additional findings are as noted. Primary Care Physician:  Kristen Mike, DO    Screenings:  [unfilled]    CHIEF COMPLAINT       Chief Complaint   Patient presents with    Urinary Retention       RECENT VITALS:    ,   ,  ,      LABS:  Labs Reviewed - No data to display    Radiology  No orders to display           Attending Physician Additional  Notes    Patient was seen at outside ED today for urinary retention likely from urethral stricture. He had symptoms similar to this when he was a child from urethral stricture and was dilated was fine until approximately 1 month ago when his symptoms returned and he had difficulty getting his urine to pass. 3 days ago he was seen in the ER and had CT abdomen showing urinary retention of greater than 200 cc, bladder diverticulum was noted. He declined urethral catheterization at that time. Today he presented to outside urgent care center and was evaluated.   They were unable to pass a Maddox catheter after 3 attempts of different sized tubes (16 andf 14 ga). He is on doxycycline and Pyridium without any improvement. He states the pain is 8/10 in intensity. No new medications such as anticholinergics or opioids. No low back pain. No saddle anesthesia. No weakness. No fecal retention or incontinence. He is able to move his stools. On exam he is in moderate distress secondary pain, there is a palpable bladder 2 cm below the umbilicus. He has tenderness to palpation. Impression is urinary retention likely from urethral stricture. Plan is urology consultation, anticipate need for perhaps filiform and followers, or even suprapubic. We will have Urojet ready for Maddox catheter insertion. If they recommend us placing catheter will attempt for the very small catheter. Will obtain BUN/creatinine, chemistries, bladder scan. Anticipate discharge with leg bag once catheter is placed. David Marin.  Milagros Mancini MD, [facility]  Attending Emergency  Physician               Griffin Dupree MD  10/08/21 [id number]

## 2025-05-27 ENCOUNTER — OFFICE VISIT (OUTPATIENT)
Dept: FAMILY MEDICINE CLINIC | Age: 40
End: 2025-05-27
Payer: COMMERCIAL

## 2025-05-27 VITALS
RESPIRATION RATE: 16 BRPM | HEIGHT: 67 IN | SYSTOLIC BLOOD PRESSURE: 116 MMHG | TEMPERATURE: 97 F | OXYGEN SATURATION: 94 % | DIASTOLIC BLOOD PRESSURE: 64 MMHG | WEIGHT: 138.6 LBS | BODY MASS INDEX: 21.75 KG/M2 | HEART RATE: 72 BPM

## 2025-05-27 DIAGNOSIS — M79.10 MYALGIA: ICD-10-CM

## 2025-05-27 DIAGNOSIS — R06.83 SNORING: ICD-10-CM

## 2025-05-27 DIAGNOSIS — Z00.00 ROUTINE GENERAL MEDICAL EXAMINATION AT A HEALTH CARE FACILITY: Primary | ICD-10-CM

## 2025-05-27 PROCEDURE — 99395 PREV VISIT EST AGE 18-39: CPT | Performed by: FAMILY MEDICINE

## 2025-05-27 SDOH — ECONOMIC STABILITY: FOOD INSECURITY: WITHIN THE PAST 12 MONTHS, THE FOOD YOU BOUGHT JUST DIDN'T LAST AND YOU DIDN'T HAVE MONEY TO GET MORE.: NEVER TRUE

## 2025-05-27 SDOH — ECONOMIC STABILITY: FOOD INSECURITY: WITHIN THE PAST 12 MONTHS, YOU WORRIED THAT YOUR FOOD WOULD RUN OUT BEFORE YOU GOT MONEY TO BUY MORE.: NEVER TRUE

## 2025-05-27 ASSESSMENT — ENCOUNTER SYMPTOMS
VOMITING: 0
CONSTIPATION: 0
WHEEZING: 0
RHINORRHEA: 0
EYE REDNESS: 0
TROUBLE SWALLOWING: 0
SHORTNESS OF BREATH: 0
EYE DISCHARGE: 0
ABDOMINAL PAIN: 0
DIARRHEA: 0
COUGH: 0
SINUS PRESSURE: 0
NAUSEA: 0
SORE THROAT: 0

## 2025-05-27 ASSESSMENT — PATIENT HEALTH QUESTIONNAIRE - PHQ9
SUM OF ALL RESPONSES TO PHQ QUESTIONS 1-9: 0
2. FEELING DOWN, DEPRESSED OR HOPELESS: NOT AT ALL
1. LITTLE INTEREST OR PLEASURE IN DOING THINGS: NOT AT ALL
SUM OF ALL RESPONSES TO PHQ QUESTIONS 1-9: 0
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
## 2025-06-10 DIAGNOSIS — G47.30 SLEEP APNEA, UNSPECIFIED TYPE: ICD-10-CM

## 2025-06-10 DIAGNOSIS — R06.83 SNORING: Primary | ICD-10-CM

## 2025-06-10 NOTE — PROGRESS NOTES
Luciana from [facility] called and advised us that his insurance sent a denial for an inpatient sleep study and suggested we order a at home sleep study. New order placed. Luciana will call patient to coordinate.

## 2025-07-28 ENCOUNTER — HOSPITAL ENCOUNTER (OUTPATIENT)
Dept: SLEEP CENTER | Age: 40
Discharge: HOME | End: 2025-07-28
Attending: FAMILY MEDICINE
Payer: COMMERCIAL

## 2025-07-28 PROCEDURE — G0399 HOME SLEEP TEST/TYPE 3 PORTA: HCPCS

## 2025-07-28 NOTE — PROGRESS NOTES
Patient came in was shown how to use the apnea link air sn# [id number]. Patient will have spouse help with the belt and return equipment the next day.

## 2025-08-20 ENCOUNTER — TELEPHONE (OUTPATIENT)
Dept: FAMILY MEDICINE CLINIC | Age: 40
End: 2025-08-20

## 2025-08-20 DIAGNOSIS — G47.33 OSA (OBSTRUCTIVE SLEEP APNEA): Primary | ICD-10-CM
